# Patient Record
Sex: MALE | Race: WHITE | NOT HISPANIC OR LATINO | Employment: OTHER | URBAN - METROPOLITAN AREA
[De-identification: names, ages, dates, MRNs, and addresses within clinical notes are randomized per-mention and may not be internally consistent; named-entity substitution may affect disease eponyms.]

---

## 2023-04-11 LAB
ALANINE AMINOTRANSFERASE (SGPT) (U/L) IN SER/PLAS: 24 U/L (ref 10–52)
ALBUMIN (G/DL) IN SER/PLAS: 4.2 G/DL (ref 3.4–5)
ALKALINE PHOSPHATASE (U/L) IN SER/PLAS: 70 U/L (ref 33–136)
ANION GAP IN SER/PLAS: 10 MMOL/L (ref 10–20)
ASPARTATE AMINOTRANSFERASE (SGOT) (U/L) IN SER/PLAS: 18 U/L (ref 9–39)
BILIRUBIN TOTAL (MG/DL) IN SER/PLAS: 0.5 MG/DL (ref 0–1.2)
CALCIUM (MG/DL) IN SER/PLAS: 8.7 MG/DL (ref 8.6–10.3)
CARBON DIOXIDE, TOTAL (MMOL/L) IN SER/PLAS: 31 MMOL/L (ref 21–32)
CHLORIDE (MMOL/L) IN SER/PLAS: 102 MMOL/L (ref 98–107)
CREATININE (MG/DL) IN SER/PLAS: 1.06 MG/DL (ref 0.5–1.3)
GFR MALE: 78 ML/MIN/1.73M2
GLUCOSE (MG/DL) IN SER/PLAS: 87 MG/DL (ref 74–99)
POTASSIUM (MMOL/L) IN SER/PLAS: 3.7 MMOL/L (ref 3.5–5.3)
PROTEIN TOTAL: 6.4 G/DL (ref 6.4–8.2)
SODIUM (MMOL/L) IN SER/PLAS: 139 MMOL/L (ref 136–145)
UREA NITROGEN (MG/DL) IN SER/PLAS: 19 MG/DL (ref 6–23)

## 2023-04-12 LAB
ESTIMATED AVERAGE GLUCOSE FOR HBA1C: 131 MG/DL
HEMOGLOBIN A1C/HEMOGLOBIN TOTAL IN BLOOD: 6.2 %

## 2023-07-11 LAB
ALANINE AMINOTRANSFERASE (SGPT) (U/L) IN SER/PLAS: 28 U/L (ref 10–52)
ALBUMIN (G/DL) IN SER/PLAS: 4.2 G/DL (ref 3.4–5)
ALKALINE PHOSPHATASE (U/L) IN SER/PLAS: 70 U/L (ref 33–136)
ANION GAP IN SER/PLAS: 14 MMOL/L (ref 10–20)
ASPARTATE AMINOTRANSFERASE (SGOT) (U/L) IN SER/PLAS: 25 U/L (ref 9–39)
BILIRUBIN TOTAL (MG/DL) IN SER/PLAS: 0.5 MG/DL (ref 0–1.2)
CALCIUM (MG/DL) IN SER/PLAS: 9 MG/DL (ref 8.6–10.3)
CARBON DIOXIDE, TOTAL (MMOL/L) IN SER/PLAS: 27 MMOL/L (ref 21–32)
CHLORIDE (MMOL/L) IN SER/PLAS: 105 MMOL/L (ref 98–107)
CHOLESTEROL (MG/DL) IN SER/PLAS: 100 MG/DL (ref 0–199)
CHOLESTEROL IN HDL (MG/DL) IN SER/PLAS: 36.7 MG/DL
CHOLESTEROL/HDL RATIO: 2.7
CREATININE (MG/DL) IN SER/PLAS: 1.34 MG/DL (ref 0.5–1.3)
GFR MALE: 59 ML/MIN/1.73M2
GLUCOSE (MG/DL) IN SER/PLAS: 84 MG/DL (ref 74–99)
LDL: 43 MG/DL (ref 0–99)
POTASSIUM (MMOL/L) IN SER/PLAS: 4 MMOL/L (ref 3.5–5.3)
PROTEIN TOTAL: 6.3 G/DL (ref 6.4–8.2)
SODIUM (MMOL/L) IN SER/PLAS: 142 MMOL/L (ref 136–145)
TRIGLYCERIDE (MG/DL) IN SER/PLAS: 102 MG/DL (ref 0–149)
URATE (MG/DL) IN SER/PLAS: 4.8 MG/DL (ref 4–7.5)
UREA NITROGEN (MG/DL) IN SER/PLAS: 20 MG/DL (ref 6–23)
VLDL: 20 MG/DL (ref 0–40)

## 2023-07-12 LAB
ESTIMATED AVERAGE GLUCOSE FOR HBA1C: 131 MG/DL
HEMOGLOBIN A1C/HEMOGLOBIN TOTAL IN BLOOD: 6.2 %

## 2023-09-04 PROBLEM — E66.812 CLASS 2 OBESITY DUE TO EXCESS CALORIES WITH BODY MASS INDEX (BMI) OF 35.0 TO 35.9 IN ADULT: Status: ACTIVE | Noted: 2023-09-04

## 2023-09-04 PROBLEM — E11.9 TYPE 2 DIABETES MELLITUS (MULTI): Status: ACTIVE | Noted: 2023-09-04

## 2023-09-04 PROBLEM — R30.0 DYSURIA: Status: ACTIVE | Noted: 2023-09-04

## 2023-09-04 PROBLEM — E78.5 HYPERLIPIDEMIA: Status: ACTIVE | Noted: 2023-09-04

## 2023-09-04 PROBLEM — M54.50 CHRONIC LOW BACK PAIN: Status: ACTIVE | Noted: 2023-09-04

## 2023-09-04 PROBLEM — N40.0 PROSTATE ENLARGEMENT: Status: ACTIVE | Noted: 2023-09-04

## 2023-09-04 PROBLEM — G89.29 CHRONIC PAIN OF RIGHT KNEE: Status: ACTIVE | Noted: 2023-09-04

## 2023-09-04 PROBLEM — F32.A DEPRESSION: Status: ACTIVE | Noted: 2023-09-04

## 2023-09-04 PROBLEM — R22.1 MASS OF NECK: Status: ACTIVE | Noted: 2023-09-04

## 2023-09-04 PROBLEM — M19.079 ARTHRITIS OF FOOT: Status: ACTIVE | Noted: 2023-09-04

## 2023-09-04 PROBLEM — R51.9 HEADACHE: Status: ACTIVE | Noted: 2023-09-04

## 2023-09-04 PROBLEM — I10 HYPERTENSION: Status: ACTIVE | Noted: 2023-09-04

## 2023-09-04 PROBLEM — G56.00 CARPAL TUNNEL SYNDROME: Status: ACTIVE | Noted: 2023-09-04

## 2023-09-04 PROBLEM — M10.9 ARTHRITIS, GOUTY: Status: ACTIVE | Noted: 2023-09-04

## 2023-09-04 PROBLEM — M79.676 TOE PAIN: Status: ACTIVE | Noted: 2023-09-04

## 2023-09-04 PROBLEM — M25.561 CHRONIC PAIN OF RIGHT KNEE: Status: ACTIVE | Noted: 2023-09-04

## 2023-09-04 PROBLEM — E66.9 OBESITY (BMI 30-39.9): Status: ACTIVE | Noted: 2023-09-04

## 2023-09-04 PROBLEM — E66.09 CLASS 2 OBESITY DUE TO EXCESS CALORIES WITH BODY MASS INDEX (BMI) OF 35.0 TO 35.9 IN ADULT: Status: ACTIVE | Noted: 2023-09-04

## 2023-09-04 PROBLEM — M17.31 POST-TRAUMATIC OSTEOARTHRITIS OF RIGHT KNEE: Status: ACTIVE | Noted: 2023-09-04

## 2023-09-04 PROBLEM — R29.898: Status: ACTIVE | Noted: 2023-09-04

## 2023-09-04 PROBLEM — M62.40 MUSCLE CONTRACTURE: Status: ACTIVE | Noted: 2023-09-04

## 2023-09-04 PROBLEM — R10.13 INTERMITTENT EPIGASTRIC ABDOMINAL PAIN: Status: ACTIVE | Noted: 2023-09-04

## 2023-09-04 PROBLEM — S83.411A TEAR OF MEDIAL COLLATERAL LIGAMENT OF RIGHT KNEE: Status: ACTIVE | Noted: 2023-09-04

## 2023-09-04 PROBLEM — G47.00 INSOMNIA: Status: ACTIVE | Noted: 2023-09-04

## 2023-09-04 PROBLEM — R22.31 MASS OF FINGER OF RIGHT HAND: Status: ACTIVE | Noted: 2023-09-04

## 2023-09-04 PROBLEM — K75.81 STEATOHEPATITIS, NON-ALCOHOLIC: Status: ACTIVE | Noted: 2023-09-04

## 2023-09-04 PROBLEM — R80.9 MICROALBUMINURIA: Status: ACTIVE | Noted: 2023-09-04

## 2023-09-04 PROBLEM — K21.9 CHRONIC GERD: Status: ACTIVE | Noted: 2023-09-04

## 2023-09-04 PROBLEM — G89.29 CHRONIC LOW BACK PAIN: Status: ACTIVE | Noted: 2023-09-04

## 2023-09-04 RX ORDER — HYDROCHLOROTHIAZIDE 25 MG/1
1 TABLET ORAL DAILY
COMMUNITY
Start: 2017-08-18 | End: 2023-10-10 | Stop reason: SDUPTHER

## 2023-09-04 RX ORDER — METOPROLOL TARTRATE 50 MG/1
1 TABLET ORAL 2 TIMES DAILY
COMMUNITY
Start: 2020-12-07 | End: 2023-10-10 | Stop reason: SDUPTHER

## 2023-09-04 RX ORDER — PANTOPRAZOLE SODIUM 40 MG/1
1 TABLET, DELAYED RELEASE ORAL DAILY
COMMUNITY
Start: 2020-11-16 | End: 2023-10-10 | Stop reason: SDUPTHER

## 2023-09-04 RX ORDER — ARIPIPRAZOLE 5 MG/1
1 TABLET ORAL DAILY
COMMUNITY
Start: 2019-07-08 | End: 2023-10-10 | Stop reason: SDUPTHER

## 2023-09-04 RX ORDER — CYCLOBENZAPRINE HCL 10 MG
1 TABLET ORAL 3 TIMES DAILY PRN
COMMUNITY
Start: 2017-08-18 | End: 2023-10-10 | Stop reason: SDUPTHER

## 2023-09-04 RX ORDER — INSULIN GLARGINE 100 [IU]/ML
120 INJECTION, SOLUTION SUBCUTANEOUS NIGHTLY
COMMUNITY
Start: 2020-04-17 | End: 2023-10-10 | Stop reason: SDUPTHER

## 2023-09-04 RX ORDER — ALLOPURINOL 300 MG/1
1 TABLET ORAL DAILY
COMMUNITY
Start: 2019-02-22 | End: 2023-10-10 | Stop reason: SDUPTHER

## 2023-09-04 RX ORDER — ASPIRIN 81 MG/1
81 TABLET ORAL
COMMUNITY
Start: 2020-12-07

## 2023-09-04 RX ORDER — TAMSULOSIN HYDROCHLORIDE 0.4 MG/1
1 CAPSULE ORAL DAILY
COMMUNITY
Start: 2017-10-03 | End: 2023-10-10 | Stop reason: SDUPTHER

## 2023-09-04 RX ORDER — MIRTAZAPINE 30 MG/1
1 TABLET, FILM COATED ORAL NIGHTLY
COMMUNITY
Start: 2018-02-08 | End: 2023-10-10 | Stop reason: SDUPTHER

## 2023-09-04 RX ORDER — INSULIN PUMP SYRINGE, 3 ML
EACH MISCELLANEOUS
COMMUNITY

## 2023-09-04 RX ORDER — IBUPROFEN 200 MG
CAPSULE ORAL 4 TIMES DAILY
COMMUNITY
Start: 2020-03-24

## 2023-09-04 RX ORDER — EXENATIDE 2 MG/.85ML
INJECTION, SUSPENSION, EXTENDED RELEASE SUBCUTANEOUS
COMMUNITY
Start: 2019-05-21 | End: 2023-10-10 | Stop reason: SDUPTHER

## 2023-09-04 RX ORDER — ATORVASTATIN CALCIUM 40 MG/1
1 TABLET, FILM COATED ORAL DAILY
COMMUNITY
Start: 2020-12-07 | End: 2023-10-10 | Stop reason: SDUPTHER

## 2023-09-04 RX ORDER — ESCITALOPRAM OXALATE 20 MG/1
1 TABLET ORAL DAILY
COMMUNITY
Start: 2017-08-18 | End: 2023-10-10 | Stop reason: SDUPTHER

## 2023-09-04 RX ORDER — METFORMIN HYDROCHLORIDE 1000 MG/1
1 TABLET ORAL 2 TIMES DAILY
COMMUNITY
Start: 2017-10-03 | End: 2023-10-10 | Stop reason: SDUPTHER

## 2023-09-04 RX ORDER — LANCETS
EACH MISCELLANEOUS 2 TIMES DAILY
COMMUNITY

## 2023-10-10 ENCOUNTER — TELEPHONE (OUTPATIENT)
Dept: PRIMARY CARE | Facility: CLINIC | Age: 63
End: 2023-10-10

## 2023-10-10 ENCOUNTER — APPOINTMENT (OUTPATIENT)
Dept: PRIMARY CARE | Facility: CLINIC | Age: 63
End: 2023-10-10
Payer: COMMERCIAL

## 2023-10-10 DIAGNOSIS — G89.29 CHRONIC LOW BACK PAIN, UNSPECIFIED BACK PAIN LATERALITY, UNSPECIFIED WHETHER SCIATICA PRESENT: ICD-10-CM

## 2023-10-10 DIAGNOSIS — F32.A DEPRESSION, UNSPECIFIED DEPRESSION TYPE: ICD-10-CM

## 2023-10-10 DIAGNOSIS — E11.9 TYPE 2 DIABETES MELLITUS WITHOUT COMPLICATION, WITH LONG-TERM CURRENT USE OF INSULIN (MULTI): ICD-10-CM

## 2023-10-10 DIAGNOSIS — I10 HYPERTENSION, UNSPECIFIED TYPE: ICD-10-CM

## 2023-10-10 DIAGNOSIS — M54.50 CHRONIC LOW BACK PAIN, UNSPECIFIED BACK PAIN LATERALITY, UNSPECIFIED WHETHER SCIATICA PRESENT: ICD-10-CM

## 2023-10-10 DIAGNOSIS — M10.9 GOUT, UNSPECIFIED CAUSE, UNSPECIFIED CHRONICITY, UNSPECIFIED SITE: ICD-10-CM

## 2023-10-10 DIAGNOSIS — N40.0 PROSTATE ENLARGEMENT: ICD-10-CM

## 2023-10-10 DIAGNOSIS — E78.5 HYPERLIPIDEMIA, UNSPECIFIED HYPERLIPIDEMIA TYPE: ICD-10-CM

## 2023-10-10 DIAGNOSIS — G47.00 INSOMNIA, UNSPECIFIED TYPE: ICD-10-CM

## 2023-10-10 DIAGNOSIS — K21.00 GASTROESOPHAGEAL REFLUX DISEASE WITH ESOPHAGITIS WITHOUT HEMORRHAGE: ICD-10-CM

## 2023-10-10 DIAGNOSIS — Z79.4 TYPE 2 DIABETES MELLITUS WITHOUT COMPLICATION, WITH LONG-TERM CURRENT USE OF INSULIN (MULTI): ICD-10-CM

## 2023-10-13 RX ORDER — CYCLOBENZAPRINE HCL 10 MG
10 TABLET ORAL 3 TIMES DAILY PRN
Qty: 90 TABLET | Refills: 0 | Status: SHIPPED | OUTPATIENT
Start: 2023-10-13 | End: 2023-10-31 | Stop reason: SDUPTHER

## 2023-10-13 RX ORDER — PANTOPRAZOLE SODIUM 40 MG/1
40 TABLET, DELAYED RELEASE ORAL DAILY
Qty: 30 TABLET | Refills: 0 | Status: SHIPPED | OUTPATIENT
Start: 2023-10-13 | End: 2023-10-31 | Stop reason: SDUPTHER

## 2023-10-13 RX ORDER — INSULIN GLARGINE 100 [IU]/ML
120 INJECTION, SOLUTION SUBCUTANEOUS NIGHTLY
Qty: 36 ML | Refills: 0 | Status: SHIPPED | OUTPATIENT
Start: 2023-10-13 | End: 2023-10-31 | Stop reason: SDUPTHER

## 2023-10-13 RX ORDER — EXENATIDE 2 MG/.85ML
1 INJECTION, SUSPENSION, EXTENDED RELEASE SUBCUTANEOUS
Qty: 3.4 ML | Refills: 0 | Status: SHIPPED | OUTPATIENT
Start: 2023-10-13 | End: 2023-10-31 | Stop reason: SDUPTHER

## 2023-10-13 RX ORDER — HYDROCHLOROTHIAZIDE 25 MG/1
25 TABLET ORAL DAILY
Qty: 30 TABLET | Refills: 0 | Status: SHIPPED | OUTPATIENT
Start: 2023-10-13 | End: 2023-10-31 | Stop reason: SDUPTHER

## 2023-10-13 RX ORDER — MIRTAZAPINE 30 MG/1
30 TABLET, FILM COATED ORAL NIGHTLY
Qty: 30 TABLET | Refills: 0 | Status: SHIPPED | OUTPATIENT
Start: 2023-10-13 | End: 2023-10-31 | Stop reason: SDUPTHER

## 2023-10-13 RX ORDER — ALLOPURINOL 300 MG/1
300 TABLET ORAL DAILY
Qty: 30 TABLET | Refills: 0 | Status: SHIPPED | OUTPATIENT
Start: 2023-10-13 | End: 2023-10-31 | Stop reason: SDUPTHER

## 2023-10-13 RX ORDER — ARIPIPRAZOLE 5 MG/1
5 TABLET ORAL DAILY
Qty: 30 TABLET | Refills: 0 | Status: SHIPPED | OUTPATIENT
Start: 2023-10-13 | End: 2023-10-31 | Stop reason: SDUPTHER

## 2023-10-13 RX ORDER — TAMSULOSIN HYDROCHLORIDE 0.4 MG/1
0.4 CAPSULE ORAL DAILY
Qty: 30 CAPSULE | Refills: 0 | Status: SHIPPED | OUTPATIENT
Start: 2023-10-13 | End: 2023-10-31 | Stop reason: SDUPTHER

## 2023-10-13 RX ORDER — METOPROLOL TARTRATE 50 MG/1
50 TABLET ORAL 2 TIMES DAILY
Qty: 60 TABLET | Refills: 0 | Status: SHIPPED | OUTPATIENT
Start: 2023-10-13 | End: 2023-10-31 | Stop reason: SDUPTHER

## 2023-10-13 RX ORDER — METFORMIN HYDROCHLORIDE 1000 MG/1
1000 TABLET ORAL 2 TIMES DAILY
Qty: 60 TABLET | Refills: 0 | Status: SHIPPED | OUTPATIENT
Start: 2023-10-13 | End: 2023-10-31 | Stop reason: SDUPTHER

## 2023-10-13 RX ORDER — ATORVASTATIN CALCIUM 40 MG/1
40 TABLET, FILM COATED ORAL DAILY
Qty: 30 TABLET | Refills: 0 | Status: SHIPPED | OUTPATIENT
Start: 2023-10-13 | End: 2023-10-31 | Stop reason: SDUPTHER

## 2023-10-13 RX ORDER — ESCITALOPRAM OXALATE 20 MG/1
20 TABLET ORAL DAILY
Qty: 30 TABLET | Refills: 0 | Status: SHIPPED | OUTPATIENT
Start: 2023-10-13 | End: 2023-10-31 | Stop reason: SDUPTHER

## 2023-10-31 ENCOUNTER — LAB (OUTPATIENT)
Dept: LAB | Facility: LAB | Age: 63
End: 2023-10-31
Payer: COMMERCIAL

## 2023-10-31 ENCOUNTER — OFFICE VISIT (OUTPATIENT)
Dept: PRIMARY CARE | Facility: CLINIC | Age: 63
End: 2023-10-31
Payer: COMMERCIAL

## 2023-10-31 VITALS
BODY MASS INDEX: 36.06 KG/M2 | TEMPERATURE: 98.5 F | DIASTOLIC BLOOD PRESSURE: 60 MMHG | SYSTOLIC BLOOD PRESSURE: 128 MMHG | HEART RATE: 76 BPM | WEIGHT: 281 LBS | HEIGHT: 74 IN | OXYGEN SATURATION: 98 %

## 2023-10-31 DIAGNOSIS — M54.50 CHRONIC LOW BACK PAIN, UNSPECIFIED BACK PAIN LATERALITY, UNSPECIFIED WHETHER SCIATICA PRESENT: ICD-10-CM

## 2023-10-31 DIAGNOSIS — G89.29 CHRONIC LOW BACK PAIN, UNSPECIFIED BACK PAIN LATERALITY, UNSPECIFIED WHETHER SCIATICA PRESENT: ICD-10-CM

## 2023-10-31 DIAGNOSIS — N40.0 PROSTATE ENLARGEMENT: ICD-10-CM

## 2023-10-31 DIAGNOSIS — G47.00 INSOMNIA, UNSPECIFIED TYPE: ICD-10-CM

## 2023-10-31 DIAGNOSIS — M10.9 GOUT, UNSPECIFIED CAUSE, UNSPECIFIED CHRONICITY, UNSPECIFIED SITE: ICD-10-CM

## 2023-10-31 DIAGNOSIS — F32.A DEPRESSION, UNSPECIFIED DEPRESSION TYPE: ICD-10-CM

## 2023-10-31 DIAGNOSIS — I10 HYPERTENSION, UNSPECIFIED TYPE: ICD-10-CM

## 2023-10-31 DIAGNOSIS — Z79.4 TYPE 2 DIABETES MELLITUS WITHOUT COMPLICATION, WITH LONG-TERM CURRENT USE OF INSULIN (MULTI): ICD-10-CM

## 2023-10-31 DIAGNOSIS — E11.9 TYPE 2 DIABETES MELLITUS WITHOUT COMPLICATION, WITH LONG-TERM CURRENT USE OF INSULIN (MULTI): ICD-10-CM

## 2023-10-31 DIAGNOSIS — K21.00 GASTROESOPHAGEAL REFLUX DISEASE WITH ESOPHAGITIS WITHOUT HEMORRHAGE: ICD-10-CM

## 2023-10-31 DIAGNOSIS — E78.5 HYPERLIPIDEMIA, UNSPECIFIED HYPERLIPIDEMIA TYPE: ICD-10-CM

## 2023-10-31 LAB
ALBUMIN SERPL BCP-MCNC: 4.4 G/DL (ref 3.4–5)
ALP SERPL-CCNC: 81 U/L (ref 33–136)
ALT SERPL W P-5'-P-CCNC: 32 U/L (ref 10–52)
ANION GAP SERPL CALC-SCNC: 12 MMOL/L (ref 10–20)
AST SERPL W P-5'-P-CCNC: 25 U/L (ref 9–39)
BILIRUB SERPL-MCNC: 0.7 MG/DL (ref 0–1.2)
BUN SERPL-MCNC: 18 MG/DL (ref 6–23)
CALCIUM SERPL-MCNC: 9.6 MG/DL (ref 8.6–10.3)
CHLORIDE SERPL-SCNC: 103 MMOL/L (ref 98–107)
CO2 SERPL-SCNC: 29 MMOL/L (ref 21–32)
CREAT SERPL-MCNC: 1.12 MG/DL (ref 0.5–1.3)
GFR SERPL CREATININE-BSD FRML MDRD: 73 ML/MIN/1.73M*2
GLUCOSE SERPL-MCNC: 137 MG/DL (ref 74–99)
POTASSIUM SERPL-SCNC: 4.1 MMOL/L (ref 3.5–5.3)
PROT SERPL-MCNC: 6.3 G/DL (ref 6.4–8.2)
SODIUM SERPL-SCNC: 140 MMOL/L (ref 136–145)
URATE SERPL-MCNC: 5 MG/DL (ref 4–7.5)

## 2023-10-31 PROCEDURE — 99215 OFFICE O/P EST HI 40 MIN: CPT | Performed by: FAMILY MEDICINE

## 2023-10-31 PROCEDURE — 3044F HG A1C LEVEL LT 7.0%: CPT | Performed by: FAMILY MEDICINE

## 2023-10-31 PROCEDURE — 1036F TOBACCO NON-USER: CPT | Performed by: FAMILY MEDICINE

## 2023-10-31 PROCEDURE — 3074F SYST BP LT 130 MM HG: CPT | Performed by: FAMILY MEDICINE

## 2023-10-31 PROCEDURE — 83036 HEMOGLOBIN GLYCOSYLATED A1C: CPT

## 2023-10-31 PROCEDURE — 84550 ASSAY OF BLOOD/URIC ACID: CPT

## 2023-10-31 PROCEDURE — 80053 COMPREHEN METABOLIC PANEL: CPT

## 2023-10-31 PROCEDURE — 3078F DIAST BP <80 MM HG: CPT | Performed by: FAMILY MEDICINE

## 2023-10-31 PROCEDURE — 36415 COLL VENOUS BLD VENIPUNCTURE: CPT

## 2023-10-31 RX ORDER — TAMSULOSIN HYDROCHLORIDE 0.4 MG/1
0.4 CAPSULE ORAL DAILY
Qty: 90 CAPSULE | Refills: 0 | Status: SHIPPED | OUTPATIENT
Start: 2023-10-31 | End: 2024-01-30 | Stop reason: SDUPTHER

## 2023-10-31 RX ORDER — ESCITALOPRAM OXALATE 20 MG/1
20 TABLET ORAL DAILY
Qty: 90 TABLET | Refills: 0 | Status: SHIPPED | OUTPATIENT
Start: 2023-10-31 | End: 2024-01-30 | Stop reason: SDUPTHER

## 2023-10-31 RX ORDER — INSULIN GLARGINE 100 [IU]/ML
120 INJECTION, SOLUTION SUBCUTANEOUS NIGHTLY
Qty: 36 ML | Refills: 2 | Status: SHIPPED | OUTPATIENT
Start: 2023-10-31 | End: 2024-01-30 | Stop reason: SDUPTHER

## 2023-10-31 RX ORDER — ARIPIPRAZOLE 5 MG/1
5 TABLET ORAL DAILY
Qty: 90 TABLET | Refills: 0 | Status: SHIPPED | OUTPATIENT
Start: 2023-10-31 | End: 2024-01-30 | Stop reason: SDUPTHER

## 2023-10-31 RX ORDER — ALLOPURINOL 300 MG/1
300 TABLET ORAL DAILY
Qty: 90 TABLET | Refills: 0 | Status: SHIPPED | OUTPATIENT
Start: 2023-10-31 | End: 2024-01-30 | Stop reason: SDUPTHER

## 2023-10-31 RX ORDER — MIRTAZAPINE 30 MG/1
30 TABLET, FILM COATED ORAL NIGHTLY
Qty: 90 TABLET | Refills: 0 | Status: SHIPPED | OUTPATIENT
Start: 2023-10-31 | End: 2024-01-30 | Stop reason: SDUPTHER

## 2023-10-31 RX ORDER — METFORMIN HYDROCHLORIDE 1000 MG/1
1000 TABLET ORAL 2 TIMES DAILY
Qty: 180 TABLET | Refills: 0 | Status: SHIPPED | OUTPATIENT
Start: 2023-10-31 | End: 2024-01-18 | Stop reason: SDUPTHER

## 2023-10-31 RX ORDER — CYCLOBENZAPRINE HCL 10 MG
10 TABLET ORAL 3 TIMES DAILY PRN
Qty: 90 TABLET | Refills: 2 | Status: SHIPPED | OUTPATIENT
Start: 2023-10-31 | End: 2024-01-30 | Stop reason: SDUPTHER

## 2023-10-31 RX ORDER — EXENATIDE 2 MG/.85ML
1 INJECTION, SUSPENSION, EXTENDED RELEASE SUBCUTANEOUS
Qty: 12 ML | Refills: 0 | Status: SHIPPED | OUTPATIENT
Start: 2023-10-31 | End: 2024-01-30 | Stop reason: SDUPTHER

## 2023-10-31 RX ORDER — ATORVASTATIN CALCIUM 40 MG/1
40 TABLET, FILM COATED ORAL DAILY
Qty: 90 TABLET | Refills: 0 | Status: SHIPPED | OUTPATIENT
Start: 2023-10-31 | End: 2024-01-30 | Stop reason: SDUPTHER

## 2023-10-31 RX ORDER — HYDROCHLOROTHIAZIDE 25 MG/1
25 TABLET ORAL DAILY
Qty: 90 TABLET | Refills: 0 | Status: SHIPPED | OUTPATIENT
Start: 2023-10-31 | End: 2024-01-30 | Stop reason: SDUPTHER

## 2023-10-31 RX ORDER — METOPROLOL TARTRATE 50 MG/1
50 TABLET ORAL 2 TIMES DAILY
Qty: 180 TABLET | Refills: 0 | Status: SHIPPED | OUTPATIENT
Start: 2023-10-31 | End: 2024-01-30 | Stop reason: SDUPTHER

## 2023-10-31 RX ORDER — PANTOPRAZOLE SODIUM 40 MG/1
40 TABLET, DELAYED RELEASE ORAL DAILY
Qty: 90 TABLET | Refills: 0 | Status: SHIPPED | OUTPATIENT
Start: 2023-10-31 | End: 2024-01-30 | Stop reason: SDUPTHER

## 2023-10-31 NOTE — PROGRESS NOTES
"Subjective   Patient ID: Domingo Conrad is a 63 y.o. male who presents for Med Refill.    HPI Pt is here for refills of medications to treat the following medical conditions. Unless otherwise stated, these conditions are well-controlled, pt is taking medications s Rx, and there are no reported side effects to medications or other treatment: Arthritis, GOUTY, chronic GERD, Depression, DM2, Hypertension, Hyperlipidemia, Insomnia, Prostate enlargement, chronic low back pain.     Review of Systems   All other systems reviewed and are negative.      Objective   /60   Pulse 76   Temp 36.9 °C (98.5 °F)   Ht 1.88 m (6' 2\")   Wt 127 kg (281 lb)   SpO2 98%   BMI 36.08 kg/m²     Physical Exam  Constitutional:       Appearance: Normal appearance.   Eyes:      Conjunctiva/sclera: Conjunctivae normal.   Cardiovascular:      Rate and Rhythm: Normal rate and regular rhythm.   Pulmonary:      Effort: Pulmonary effort is normal.      Breath sounds: Normal breath sounds.   Abdominal:      Palpations: Abdomen is soft.   Musculoskeletal:         General: Normal range of motion.   Skin:     General: Skin is warm and dry.   Neurological:      Mental Status: He is alert.   Psychiatric:         Mood and Affect: Mood normal.         Assessment/Plan Chronic medications refilled per protocol.         "

## 2023-11-01 LAB
EST. AVERAGE GLUCOSE BLD GHB EST-MCNC: 126 MG/DL
HBA1C MFR BLD: 6 %

## 2024-01-15 DIAGNOSIS — Z79.4 TYPE 2 DIABETES MELLITUS WITHOUT COMPLICATION, WITH LONG-TERM CURRENT USE OF INSULIN (MULTI): ICD-10-CM

## 2024-01-15 DIAGNOSIS — E11.9 TYPE 2 DIABETES MELLITUS WITHOUT COMPLICATION, WITH LONG-TERM CURRENT USE OF INSULIN (MULTI): ICD-10-CM

## 2024-01-16 RX ORDER — METFORMIN HYDROCHLORIDE 1000 MG/1
1000 TABLET ORAL 2 TIMES DAILY
Qty: 180 TABLET | Refills: 0 | OUTPATIENT
Start: 2024-01-16

## 2024-01-18 DIAGNOSIS — E11.9 TYPE 2 DIABETES MELLITUS WITHOUT COMPLICATION, WITH LONG-TERM CURRENT USE OF INSULIN (MULTI): ICD-10-CM

## 2024-01-18 DIAGNOSIS — Z79.4 TYPE 2 DIABETES MELLITUS WITHOUT COMPLICATION, WITH LONG-TERM CURRENT USE OF INSULIN (MULTI): ICD-10-CM

## 2024-01-18 RX ORDER — LANCETS 33 GAUGE
1 EACH MISCELLANEOUS 2 TIMES DAILY
COMMUNITY
Start: 2023-10-30

## 2024-01-18 RX ORDER — PEN NEEDLE, DIABETIC 31 GX3/16"
1 NEEDLE, DISPOSABLE MISCELLANEOUS DAILY
COMMUNITY
Start: 2023-11-10 | End: 2024-01-30 | Stop reason: SDUPTHER

## 2024-01-18 NOTE — TELEPHONE ENCOUNTER
Patient has appointment  scheduled 1/31/24 but will be out of metformin before visit.  30 day rx pended per office protocol for approval.

## 2024-01-19 RX ORDER — METFORMIN HYDROCHLORIDE 1000 MG/1
1000 TABLET ORAL 2 TIMES DAILY
Qty: 180 TABLET | Refills: 0 | OUTPATIENT
Start: 2024-01-19

## 2024-01-19 RX ORDER — METFORMIN HYDROCHLORIDE 1000 MG/1
1000 TABLET ORAL 2 TIMES DAILY
Qty: 60 TABLET | Refills: 0 | Status: SHIPPED | OUTPATIENT
Start: 2024-01-19 | End: 2024-01-30 | Stop reason: SDUPTHER

## 2024-01-30 ENCOUNTER — LAB (OUTPATIENT)
Dept: LAB | Facility: LAB | Age: 64
End: 2024-01-30
Payer: COMMERCIAL

## 2024-01-30 ENCOUNTER — OFFICE VISIT (OUTPATIENT)
Dept: PRIMARY CARE | Facility: CLINIC | Age: 64
End: 2024-01-30
Payer: COMMERCIAL

## 2024-01-30 VITALS
BODY MASS INDEX: 35.68 KG/M2 | WEIGHT: 278 LBS | TEMPERATURE: 98.1 F | OXYGEN SATURATION: 98 % | DIASTOLIC BLOOD PRESSURE: 64 MMHG | HEIGHT: 74 IN | HEART RATE: 90 BPM | SYSTOLIC BLOOD PRESSURE: 122 MMHG

## 2024-01-30 DIAGNOSIS — M54.50 CHRONIC LOW BACK PAIN, UNSPECIFIED BACK PAIN LATERALITY, UNSPECIFIED WHETHER SCIATICA PRESENT: ICD-10-CM

## 2024-01-30 DIAGNOSIS — N40.0 PROSTATE ENLARGEMENT: ICD-10-CM

## 2024-01-30 DIAGNOSIS — G89.29 CHRONIC LOW BACK PAIN, UNSPECIFIED BACK PAIN LATERALITY, UNSPECIFIED WHETHER SCIATICA PRESENT: ICD-10-CM

## 2024-01-30 DIAGNOSIS — E11.9 TYPE 2 DIABETES MELLITUS WITHOUT COMPLICATION, WITH LONG-TERM CURRENT USE OF INSULIN (MULTI): ICD-10-CM

## 2024-01-30 DIAGNOSIS — M10.9 GOUT, UNSPECIFIED CAUSE, UNSPECIFIED CHRONICITY, UNSPECIFIED SITE: ICD-10-CM

## 2024-01-30 DIAGNOSIS — E78.5 HYPERLIPIDEMIA, UNSPECIFIED HYPERLIPIDEMIA TYPE: ICD-10-CM

## 2024-01-30 DIAGNOSIS — G47.00 INSOMNIA, UNSPECIFIED TYPE: ICD-10-CM

## 2024-01-30 DIAGNOSIS — Z79.4 TYPE 2 DIABETES MELLITUS WITHOUT COMPLICATION, WITH LONG-TERM CURRENT USE OF INSULIN (MULTI): ICD-10-CM

## 2024-01-30 DIAGNOSIS — K21.00 GASTROESOPHAGEAL REFLUX DISEASE WITH ESOPHAGITIS WITHOUT HEMORRHAGE: ICD-10-CM

## 2024-01-30 DIAGNOSIS — I10 HYPERTENSION, UNSPECIFIED TYPE: ICD-10-CM

## 2024-01-30 DIAGNOSIS — F32.A DEPRESSION, UNSPECIFIED DEPRESSION TYPE: ICD-10-CM

## 2024-01-30 LAB
ALBUMIN SERPL BCP-MCNC: 4.2 G/DL (ref 3.4–5)
ALP SERPL-CCNC: 71 U/L (ref 33–136)
ALT SERPL W P-5'-P-CCNC: 31 U/L (ref 10–52)
ANION GAP SERPL CALC-SCNC: 10 MMOL/L (ref 10–20)
AST SERPL W P-5'-P-CCNC: 24 U/L (ref 9–39)
BILIRUB SERPL-MCNC: 0.6 MG/DL (ref 0–1.2)
BUN SERPL-MCNC: 19 MG/DL (ref 6–23)
CALCIUM SERPL-MCNC: 8.9 MG/DL (ref 8.6–10.3)
CHLORIDE SERPL-SCNC: 103 MMOL/L (ref 98–107)
CHOLEST SERPL-MCNC: 101 MG/DL (ref 0–199)
CHOLESTEROL/HDL RATIO: 2.7
CO2 SERPL-SCNC: 32 MMOL/L (ref 21–32)
CREAT SERPL-MCNC: 1.23 MG/DL (ref 0.5–1.3)
EGFRCR SERPLBLD CKD-EPI 2021: 66 ML/MIN/1.73M*2
GLUCOSE SERPL-MCNC: 76 MG/DL (ref 74–99)
HDLC SERPL-MCNC: 37 MG/DL
LDLC SERPL CALC-MCNC: 46 MG/DL
NON HDL CHOLESTEROL: 64 MG/DL (ref 0–149)
POTASSIUM SERPL-SCNC: 4.2 MMOL/L (ref 3.5–5.3)
PROT SERPL-MCNC: 6.1 G/DL (ref 6.4–8.2)
PSA SERPL-MCNC: 1.3 NG/ML
SODIUM SERPL-SCNC: 141 MMOL/L (ref 136–145)
TRIGL SERPL-MCNC: 89 MG/DL (ref 0–149)
URATE SERPL-MCNC: 4.8 MG/DL (ref 4–7.5)
VLDL: 18 MG/DL (ref 0–40)

## 2024-01-30 PROCEDURE — 80053 COMPREHEN METABOLIC PANEL: CPT

## 2024-01-30 PROCEDURE — 84550 ASSAY OF BLOOD/URIC ACID: CPT

## 2024-01-30 PROCEDURE — 3074F SYST BP LT 130 MM HG: CPT | Performed by: FAMILY MEDICINE

## 2024-01-30 PROCEDURE — 99215 OFFICE O/P EST HI 40 MIN: CPT | Performed by: FAMILY MEDICINE

## 2024-01-30 PROCEDURE — 84153 ASSAY OF PSA TOTAL: CPT

## 2024-01-30 PROCEDURE — 80061 LIPID PANEL: CPT

## 2024-01-30 PROCEDURE — 3078F DIAST BP <80 MM HG: CPT | Performed by: FAMILY MEDICINE

## 2024-01-30 PROCEDURE — 36415 COLL VENOUS BLD VENIPUNCTURE: CPT

## 2024-01-30 PROCEDURE — 83036 HEMOGLOBIN GLYCOSYLATED A1C: CPT

## 2024-01-30 PROCEDURE — 1036F TOBACCO NON-USER: CPT | Performed by: FAMILY MEDICINE

## 2024-01-30 RX ORDER — LANCETS
EACH MISCELLANEOUS
Qty: 125 EACH | Refills: 11 | Status: SHIPPED | OUTPATIENT
Start: 2024-01-30

## 2024-01-30 RX ORDER — ARIPIPRAZOLE 5 MG/1
5 TABLET ORAL DAILY
Qty: 90 TABLET | Refills: 0 | Status: SHIPPED | OUTPATIENT
Start: 2024-01-30 | End: 2024-04-30 | Stop reason: SDUPTHER

## 2024-01-30 RX ORDER — ALLOPURINOL 300 MG/1
300 TABLET ORAL DAILY
Qty: 90 TABLET | Refills: 0 | Status: SHIPPED | OUTPATIENT
Start: 2024-01-30 | End: 2024-04-30 | Stop reason: SDUPTHER

## 2024-01-30 RX ORDER — MIRTAZAPINE 30 MG/1
30 TABLET, FILM COATED ORAL NIGHTLY
Qty: 90 TABLET | Refills: 0 | Status: SHIPPED | OUTPATIENT
Start: 2024-01-30 | End: 2024-04-30 | Stop reason: SDUPTHER

## 2024-01-30 RX ORDER — ESCITALOPRAM OXALATE 20 MG/1
20 TABLET ORAL DAILY
Qty: 90 TABLET | Refills: 0 | Status: SHIPPED | OUTPATIENT
Start: 2024-01-30 | End: 2024-04-30 | Stop reason: SDUPTHER

## 2024-01-30 RX ORDER — TAMSULOSIN HYDROCHLORIDE 0.4 MG/1
0.4 CAPSULE ORAL DAILY
Qty: 90 CAPSULE | Refills: 0 | Status: SHIPPED | OUTPATIENT
Start: 2024-01-30 | End: 2024-04-30 | Stop reason: SDUPTHER

## 2024-01-30 RX ORDER — HYDROCHLOROTHIAZIDE 25 MG/1
25 TABLET ORAL DAILY
Qty: 90 TABLET | Refills: 0 | Status: SHIPPED | OUTPATIENT
Start: 2024-01-30 | End: 2024-05-20

## 2024-01-30 RX ORDER — CYCLOBENZAPRINE HCL 10 MG
10 TABLET ORAL 3 TIMES DAILY PRN
Qty: 90 TABLET | Refills: 2 | Status: SHIPPED | OUTPATIENT
Start: 2024-01-30 | End: 2024-04-30 | Stop reason: SDUPTHER

## 2024-01-30 RX ORDER — IBUPROFEN 200 MG
1 CAPSULE ORAL 4 TIMES DAILY
Qty: 125 STRIP | Refills: 11 | Status: SHIPPED | OUTPATIENT
Start: 2024-01-30 | End: 2025-01-29

## 2024-01-30 RX ORDER — ATORVASTATIN CALCIUM 40 MG/1
40 TABLET, FILM COATED ORAL DAILY
Qty: 90 TABLET | Refills: 0 | Status: SHIPPED | OUTPATIENT
Start: 2024-01-30 | End: 2024-04-30 | Stop reason: SDUPTHER

## 2024-01-30 RX ORDER — EXENATIDE 2 MG/.85ML
1 INJECTION, SUSPENSION, EXTENDED RELEASE SUBCUTANEOUS
Qty: 12 ML | Refills: 0 | Status: SHIPPED | OUTPATIENT
Start: 2024-01-30 | End: 2024-04-30 | Stop reason: SDUPTHER

## 2024-01-30 RX ORDER — METFORMIN HYDROCHLORIDE 1000 MG/1
1000 TABLET ORAL 2 TIMES DAILY
Qty: 60 TABLET | Refills: 0 | Status: SHIPPED | OUTPATIENT
Start: 2024-01-30 | End: 2024-03-12 | Stop reason: SDUPTHER

## 2024-01-30 RX ORDER — PANTOPRAZOLE SODIUM 40 MG/1
40 TABLET, DELAYED RELEASE ORAL DAILY
Qty: 90 TABLET | Refills: 0 | Status: SHIPPED | OUTPATIENT
Start: 2024-01-30 | End: 2024-04-30 | Stop reason: SDUPTHER

## 2024-01-30 RX ORDER — INSULIN GLARGINE 100 [IU]/ML
120 INJECTION, SOLUTION SUBCUTANEOUS NIGHTLY
Qty: 36 ML | Refills: 2 | Status: SHIPPED | OUTPATIENT
Start: 2024-01-30 | End: 2024-04-30 | Stop reason: SDUPTHER

## 2024-01-30 RX ORDER — METOPROLOL TARTRATE 50 MG/1
50 TABLET ORAL 2 TIMES DAILY
Qty: 180 TABLET | Refills: 0 | Status: SHIPPED | OUTPATIENT
Start: 2024-01-30 | End: 2024-04-30 | Stop reason: SDUPTHER

## 2024-01-30 RX ORDER — DEXTROSE 4 G
TABLET,CHEWABLE ORAL
Qty: 1 EACH | Refills: 0 | Status: SHIPPED | OUTPATIENT
Start: 2024-01-30

## 2024-01-30 RX ORDER — PEN NEEDLE, DIABETIC 30 GX3/16"
1 NEEDLE, DISPOSABLE MISCELLANEOUS DAILY
Qty: 100 EACH | Refills: 3 | Status: SHIPPED | OUTPATIENT
Start: 2024-01-30 | End: 2024-06-04

## 2024-01-30 NOTE — PROGRESS NOTES
"Subjective   Patient ID: Domingo Conrad is a 63 y.o. male who presents for Med Refill (If fasting for labs. ).    HPI Pt is here for refills of medications to treat the following medical conditions. Unless otherwise stated, these conditions are well-controlled, pt is taking medications s Rx, and there are no reported side effects to medications or other treatment: DM2, GOUT, Chronic low back pain, Depression, Hyperlipidemia, Prostate enlargement, Insomnia.     Also states his blood sugar numbers are all over the place. Has brought in list of readings. Averaging 105 fasting in the mornings and around 200 at times at night. Has increased dose of Lantus to 143 at night. Is requesting new glucometer with supplies because maybe his machine is broken. Also would like to discuss possibly increasing dose of Metformin because of his numbers.     Review of Systems   All other systems reviewed and are negative.      Objective   /64   Pulse 90   Temp 36.7 °C (98.1 °F)   Ht 1.88 m (6' 2\")   Wt 126 kg (278 lb)   SpO2 98%   BMI 35.69 kg/m²     Physical Exam  Constitutional:       Appearance: Normal appearance.   Eyes:      Conjunctiva/sclera: Conjunctivae normal.   Cardiovascular:      Rate and Rhythm: Normal rate and regular rhythm.   Pulmonary:      Effort: Pulmonary effort is normal.      Breath sounds: Normal breath sounds.   Abdominal:      Palpations: Abdomen is soft.   Musculoskeletal:         General: Normal range of motion.   Skin:     General: Skin is warm and dry.   Neurological:      Mental Status: He is alert.   Psychiatric:         Mood and Affect: Mood normal.         Assessment/Plan Chronic medications refilled per protocol. Reviewed his home numbers and all are in range for the most part. Post prandials at or less than 200. Fasting numbers . Will have him continue current dose. Reminded him to adjust his Lantus on the AM FBS number NOT the bedtime number. He has been increasing Lantus by 2u each " time he has bedtime number above 140. He states he understands this is not correct and could repeat to me the proper procedure for adjusting his Lantus. Since his AM numbers are all in range, however, we will not make any adjustments.   RTC 3 months/PRN

## 2024-01-31 LAB
EST. AVERAGE GLUCOSE BLD GHB EST-MCNC: 120 MG/DL
HBA1C MFR BLD: 5.8 %

## 2024-02-02 DIAGNOSIS — G47.00 INSOMNIA, UNSPECIFIED TYPE: ICD-10-CM

## 2024-02-02 RX ORDER — MIRTAZAPINE 30 MG/1
30 TABLET, FILM COATED ORAL NIGHTLY
Qty: 90 TABLET | Refills: 0 | OUTPATIENT
Start: 2024-02-02

## 2024-02-05 ENCOUNTER — TELEPHONE (OUTPATIENT)
Dept: PRIMARY CARE | Facility: CLINIC | Age: 64
End: 2024-02-05
Payer: COMMERCIAL

## 2024-02-12 ENCOUNTER — TELEPHONE (OUTPATIENT)
Dept: PRIMARY CARE | Facility: CLINIC | Age: 64
End: 2024-02-12
Payer: COMMERCIAL

## 2024-02-15 DIAGNOSIS — E11.9 TYPE 2 DIABETES MELLITUS WITHOUT COMPLICATION, WITH LONG-TERM CURRENT USE OF INSULIN (MULTI): ICD-10-CM

## 2024-02-15 DIAGNOSIS — I10 HYPERTENSION, UNSPECIFIED TYPE: ICD-10-CM

## 2024-02-15 DIAGNOSIS — Z79.4 TYPE 2 DIABETES MELLITUS WITHOUT COMPLICATION, WITH LONG-TERM CURRENT USE OF INSULIN (MULTI): ICD-10-CM

## 2024-02-16 RX ORDER — METFORMIN HYDROCHLORIDE 1000 MG/1
1000 TABLET ORAL 2 TIMES DAILY
Qty: 120 TABLET | Refills: 0 | OUTPATIENT
Start: 2024-02-16 | End: 2024-04-16

## 2024-03-12 DIAGNOSIS — Z79.4 TYPE 2 DIABETES MELLITUS WITHOUT COMPLICATION, WITH LONG-TERM CURRENT USE OF INSULIN (MULTI): ICD-10-CM

## 2024-03-12 DIAGNOSIS — E11.9 TYPE 2 DIABETES MELLITUS WITHOUT COMPLICATION, WITH LONG-TERM CURRENT USE OF INSULIN (MULTI): ICD-10-CM

## 2024-03-12 NOTE — TELEPHONE ENCOUNTER
Patient called stating he was only given one month supply of Metformin and is needing additional two month supply. Order placed. Please approve.

## 2024-03-14 RX ORDER — METFORMIN HYDROCHLORIDE 1000 MG/1
1000 TABLET ORAL 2 TIMES DAILY
Qty: 60 TABLET | Refills: 1 | Status: SHIPPED | OUTPATIENT
Start: 2024-03-14 | End: 2024-04-30 | Stop reason: SDUPTHER

## 2024-04-01 ENCOUNTER — TELEPHONE (OUTPATIENT)
Dept: PRIMARY CARE | Facility: CLINIC | Age: 64
End: 2024-04-01
Payer: COMMERCIAL

## 2024-04-25 DIAGNOSIS — Z79.4 TYPE 2 DIABETES MELLITUS WITHOUT COMPLICATION, WITH LONG-TERM CURRENT USE OF INSULIN (MULTI): ICD-10-CM

## 2024-04-25 DIAGNOSIS — E11.9 TYPE 2 DIABETES MELLITUS WITHOUT COMPLICATION, WITH LONG-TERM CURRENT USE OF INSULIN (MULTI): ICD-10-CM

## 2024-04-26 RX ORDER — INSULIN GLARGINE 100 [IU]/ML
120 INJECTION, SOLUTION SUBCUTANEOUS NIGHTLY
Qty: 36 ML | Refills: 0 | OUTPATIENT
Start: 2024-04-26 | End: 2024-07-25

## 2024-04-30 ENCOUNTER — OFFICE VISIT (OUTPATIENT)
Dept: PRIMARY CARE | Facility: CLINIC | Age: 64
End: 2024-04-30
Payer: COMMERCIAL

## 2024-04-30 ENCOUNTER — LAB (OUTPATIENT)
Dept: LAB | Facility: LAB | Age: 64
End: 2024-04-30
Payer: COMMERCIAL

## 2024-04-30 VITALS
HEART RATE: 81 BPM | BODY MASS INDEX: 35.42 KG/M2 | DIASTOLIC BLOOD PRESSURE: 70 MMHG | WEIGHT: 276 LBS | HEIGHT: 74 IN | OXYGEN SATURATION: 98 % | TEMPERATURE: 98.1 F | SYSTOLIC BLOOD PRESSURE: 130 MMHG

## 2024-04-30 DIAGNOSIS — I10 HYPERTENSION, UNSPECIFIED TYPE: ICD-10-CM

## 2024-04-30 DIAGNOSIS — F32.A DEPRESSION, UNSPECIFIED DEPRESSION TYPE: ICD-10-CM

## 2024-04-30 DIAGNOSIS — Z79.4 TYPE 2 DIABETES MELLITUS WITHOUT COMPLICATION, WITH LONG-TERM CURRENT USE OF INSULIN (MULTI): ICD-10-CM

## 2024-04-30 DIAGNOSIS — G89.29 CHRONIC LOW BACK PAIN, UNSPECIFIED BACK PAIN LATERALITY, UNSPECIFIED WHETHER SCIATICA PRESENT: ICD-10-CM

## 2024-04-30 DIAGNOSIS — N40.0 PROSTATE ENLARGEMENT: ICD-10-CM

## 2024-04-30 DIAGNOSIS — M54.50 CHRONIC LOW BACK PAIN, UNSPECIFIED BACK PAIN LATERALITY, UNSPECIFIED WHETHER SCIATICA PRESENT: ICD-10-CM

## 2024-04-30 DIAGNOSIS — E78.5 HYPERLIPIDEMIA, UNSPECIFIED HYPERLIPIDEMIA TYPE: ICD-10-CM

## 2024-04-30 DIAGNOSIS — E11.9 TYPE 2 DIABETES MELLITUS WITHOUT COMPLICATION, WITH LONG-TERM CURRENT USE OF INSULIN (MULTI): ICD-10-CM

## 2024-04-30 DIAGNOSIS — M10.9 GOUT, UNSPECIFIED CAUSE, UNSPECIFIED CHRONICITY, UNSPECIFIED SITE: ICD-10-CM

## 2024-04-30 DIAGNOSIS — G47.00 INSOMNIA, UNSPECIFIED TYPE: ICD-10-CM

## 2024-04-30 DIAGNOSIS — K21.00 GASTROESOPHAGEAL REFLUX DISEASE WITH ESOPHAGITIS WITHOUT HEMORRHAGE: ICD-10-CM

## 2024-04-30 LAB
ALBUMIN SERPL BCP-MCNC: 4.4 G/DL (ref 3.4–5)
ALP SERPL-CCNC: 85 U/L (ref 33–136)
ALT SERPL W P-5'-P-CCNC: 27 U/L (ref 10–52)
ANION GAP SERPL CALC-SCNC: 12 MMOL/L (ref 10–20)
AST SERPL W P-5'-P-CCNC: 21 U/L (ref 9–39)
BILIRUB SERPL-MCNC: 0.8 MG/DL (ref 0–1.2)
BUN SERPL-MCNC: 18 MG/DL (ref 6–23)
CALCIUM SERPL-MCNC: 9.2 MG/DL (ref 8.6–10.3)
CHLORIDE SERPL-SCNC: 103 MMOL/L (ref 98–107)
CO2 SERPL-SCNC: 29 MMOL/L (ref 21–32)
CREAT SERPL-MCNC: 1.16 MG/DL (ref 0.5–1.3)
EGFRCR SERPLBLD CKD-EPI 2021: 70 ML/MIN/1.73M*2
GLUCOSE SERPL-MCNC: 76 MG/DL (ref 74–99)
POTASSIUM SERPL-SCNC: 3.7 MMOL/L (ref 3.5–5.3)
PROT SERPL-MCNC: 6.6 G/DL (ref 6.4–8.2)
SODIUM SERPL-SCNC: 140 MMOL/L (ref 136–145)

## 2024-04-30 PROCEDURE — 3075F SYST BP GE 130 - 139MM HG: CPT | Performed by: FAMILY MEDICINE

## 2024-04-30 PROCEDURE — 3044F HG A1C LEVEL LT 7.0%: CPT | Performed by: FAMILY MEDICINE

## 2024-04-30 PROCEDURE — 83036 HEMOGLOBIN GLYCOSYLATED A1C: CPT

## 2024-04-30 PROCEDURE — 3048F LDL-C <100 MG/DL: CPT | Performed by: FAMILY MEDICINE

## 2024-04-30 PROCEDURE — 99214 OFFICE O/P EST MOD 30 MIN: CPT | Performed by: FAMILY MEDICINE

## 2024-04-30 PROCEDURE — 36415 COLL VENOUS BLD VENIPUNCTURE: CPT

## 2024-04-30 PROCEDURE — 80053 COMPREHEN METABOLIC PANEL: CPT

## 2024-04-30 PROCEDURE — 3078F DIAST BP <80 MM HG: CPT | Performed by: FAMILY MEDICINE

## 2024-04-30 RX ORDER — INSULIN GLARGINE 100 [IU]/ML
120 INJECTION, SOLUTION SUBCUTANEOUS NIGHTLY
Qty: 36 ML | Refills: 2 | Status: SHIPPED | OUTPATIENT
Start: 2024-04-30 | End: 2024-07-29

## 2024-04-30 RX ORDER — MIRTAZAPINE 30 MG/1
30 TABLET, FILM COATED ORAL NIGHTLY
Qty: 90 TABLET | Refills: 0 | Status: SHIPPED | OUTPATIENT
Start: 2024-04-30 | End: 2024-07-29

## 2024-04-30 RX ORDER — ALLOPURINOL 300 MG/1
300 TABLET ORAL DAILY
Qty: 90 TABLET | Refills: 0 | Status: SHIPPED | OUTPATIENT
Start: 2024-04-30 | End: 2024-07-29

## 2024-04-30 RX ORDER — METOPROLOL TARTRATE 50 MG/1
50 TABLET ORAL 2 TIMES DAILY
Qty: 180 TABLET | Refills: 0 | Status: SHIPPED | OUTPATIENT
Start: 2024-04-30 | End: 2024-05-20

## 2024-04-30 RX ORDER — METFORMIN HYDROCHLORIDE 1000 MG/1
1000 TABLET ORAL 2 TIMES DAILY
Qty: 180 TABLET | Refills: 0 | Status: SHIPPED | OUTPATIENT
Start: 2024-04-30 | End: 2024-07-29

## 2024-04-30 RX ORDER — EXENATIDE 2 MG/.85ML
1 INJECTION, SUSPENSION, EXTENDED RELEASE SUBCUTANEOUS
Qty: 12 ML | Refills: 0 | Status: SHIPPED | OUTPATIENT
Start: 2024-05-05 | End: 2024-05-07 | Stop reason: SDUPTHER

## 2024-04-30 RX ORDER — ESCITALOPRAM OXALATE 20 MG/1
20 TABLET ORAL DAILY
Qty: 90 TABLET | Refills: 0 | Status: SHIPPED | OUTPATIENT
Start: 2024-04-30 | End: 2024-07-29

## 2024-04-30 RX ORDER — PANTOPRAZOLE SODIUM 40 MG/1
40 TABLET, DELAYED RELEASE ORAL DAILY
Qty: 90 TABLET | Refills: 0 | Status: SHIPPED | OUTPATIENT
Start: 2024-04-30 | End: 2024-07-29

## 2024-04-30 RX ORDER — ARIPIPRAZOLE 5 MG/1
5 TABLET ORAL DAILY
Qty: 90 TABLET | Refills: 0 | Status: SHIPPED | OUTPATIENT
Start: 2024-04-30 | End: 2024-07-29

## 2024-04-30 RX ORDER — ATORVASTATIN CALCIUM 40 MG/1
40 TABLET, FILM COATED ORAL DAILY
Qty: 90 TABLET | Refills: 0 | Status: SHIPPED | OUTPATIENT
Start: 2024-04-30 | End: 2024-07-29

## 2024-04-30 RX ORDER — TAMSULOSIN HYDROCHLORIDE 0.4 MG/1
0.4 CAPSULE ORAL DAILY
Qty: 90 CAPSULE | Refills: 0 | Status: SHIPPED | OUTPATIENT
Start: 2024-04-30 | End: 2024-07-29

## 2024-04-30 RX ORDER — CYCLOBENZAPRINE HCL 10 MG
10 TABLET ORAL 3 TIMES DAILY PRN
Qty: 90 TABLET | Refills: 2 | Status: SHIPPED | OUTPATIENT
Start: 2024-04-30 | End: 2024-07-29

## 2024-04-30 NOTE — PROGRESS NOTES
"Subjective   Patient ID: Domingo Conrad is a 63 y.o. male who presents for Med Refill (Is fasting for labs.).    HPI     Pt is here for refills of medications to treat the following medical conditions. Unless otherwise stated, these conditions are well-controlled, pt is taking medications as Rx, and there are no reported side effects to medications or other treatment: GOUT, Hyperlipidemia, Hypertension, Depression, Chronic low back pain, DM2 - States he bases his Lantus on a sliding scale is now averaging between 120-150 units nightly.     Review of Systems   All other systems reviewed and are negative.      Objective   /70   Pulse 81   Temp 36.7 °C (98.1 °F)   Ht 1.88 m (6' 2\")   Wt 125 kg (276 lb)   SpO2 98%   BMI 35.44 kg/m²     Physical Exam  Vitals reviewed.   Constitutional:       General: He is awake.      Appearance: Normal appearance. He is well-developed.   HENT:      Head: Normocephalic and atraumatic.   Cardiovascular:      Rate and Rhythm: Normal rate.   Pulmonary:      Effort: Pulmonary effort is normal.   Musculoskeletal:      Cervical back: Full passive range of motion without pain.      Right lower leg: No edema.      Left lower leg: No edema.   Skin:     General: Skin is warm and dry.      Findings: No lesion or rash.   Neurological:      General: No focal deficit present.      Mental Status: He is alert and oriented to person, place, and time.      Cranial Nerves: No facial asymmetry.      Motor: Motor function is intact.      Gait: Gait is intact.   Psychiatric:         Attention and Perception: Attention normal.         Mood and Affect: Mood and affect normal.         Assessment/Plan   Diagnoses and all orders for this visit:  Type 2 diabetes mellitus without complication, with long-term current use of insulin (Multi)  -     Comprehensive metabolic panel; Future  -     Hemoglobin A1c; Future  -     SITagliptin phosphate (Januvia) 100 mg tablet; Take 1 tablet (100 mg) by mouth once " daily.  -     metFORMIN (Glucophage) 1,000 mg tablet; Take 1 tablet (1,000 mg) by mouth 2 times a day.  -     exenatide microspheres (Bydureon BCise) 2 mg/0.85 mL auto-injector; Inject 1 Syringe under the skin 1 (one) time per week. Dispense  -     insulin glargine (Lantus Solostar U-100 Insulin) 100 unit/mL (3 mL) pen; Inject 120 Units under the skin once daily at bedtime.  Prostate enlargement  -     tamsulosin (Flomax) 0.4 mg 24 hr capsule; Take 1 capsule (0.4 mg) by mouth once daily.  Gastroesophageal reflux disease with esophagitis without hemorrhage  -     pantoprazole (ProtoNix) 40 mg EC tablet; Take 1 tablet (40 mg) by mouth once daily.  Insomnia, unspecified type  -     mirtazapine (Remeron) 30 mg tablet; Take 1 tablet (30 mg) by mouth once daily at bedtime.  Hypertension, unspecified type  -     metoprolol tartrate (Lopressor) 50 mg tablet; Take 1 tablet by mouth 2 times a day.  Depression, unspecified depression type  -     escitalopram (Lexapro) 20 mg tablet; Take 1 tablet (20 mg) by mouth once daily.  -     ARIPiprazole (Abilify) 5 mg tablet; Take 1 tablet (5 mg) by mouth once daily.  Chronic low back pain, unspecified back pain laterality, unspecified whether sciatica present  -     cyclobenzaprine (Flexeril) 10 mg tablet; Take 1 tablet (10 mg) by mouth 3 times a day as needed for muscle spasms.  Hyperlipidemia, unspecified hyperlipidemia type  -     atorvastatin (Lipitor) 40 mg tablet; Take 1 tablet (40 mg) by mouth once daily.  Gout, unspecified cause, unspecified chronicity, unspecified site  -     allopurinol (Zyloprim) 300 mg tablet; Take 1 tablet (300 mg) by mouth once daily.    Chronic medications refilled x 3 months. Labs obtained as above. RTC 3 months or as indicated based on labs. Did confirm pt's understanding of sliding scale Lantus - states that he is decreasing by 2 units when sugar is below 90 and increasing if sugar is above 140. States that sugar is usually between  in the  mornings -- confirmed that he is NOT increasing or decreasing his Lantus when numbers are in this range. Pt did confirm that that was correct.

## 2024-05-03 LAB
EST. AVERAGE GLUCOSE BLD GHB EST-MCNC: 117 MG/DL
HBA1C MFR BLD: 5.7 %

## 2024-05-07 DIAGNOSIS — E11.9 TYPE 2 DIABETES MELLITUS WITHOUT COMPLICATION, WITH LONG-TERM CURRENT USE OF INSULIN (MULTI): ICD-10-CM

## 2024-05-07 DIAGNOSIS — Z79.4 TYPE 2 DIABETES MELLITUS WITHOUT COMPLICATION, WITH LONG-TERM CURRENT USE OF INSULIN (MULTI): ICD-10-CM

## 2024-05-07 RX ORDER — EXENATIDE 2 MG/.85ML
1 INJECTION, SUSPENSION, EXTENDED RELEASE SUBCUTANEOUS
Qty: 10.2 ML | Refills: 0 | Status: SHIPPED | OUTPATIENT
Start: 2024-05-12 | End: 2024-08-10

## 2024-05-07 NOTE — PROGRESS NOTES
Pharmacy requested clarification regarding quantity of pens.  Sent as 12ml corrected to 12 pens. tls

## 2024-05-17 DIAGNOSIS — I10 HYPERTENSION, UNSPECIFIED TYPE: ICD-10-CM

## 2024-05-20 RX ORDER — HYDROCHLOROTHIAZIDE 25 MG/1
25 TABLET ORAL DAILY
Qty: 90 TABLET | Refills: 0 | Status: SHIPPED | OUTPATIENT
Start: 2024-05-20

## 2024-05-20 RX ORDER — METOPROLOL TARTRATE 50 MG/1
50 TABLET ORAL 2 TIMES DAILY
Qty: 180 TABLET | Refills: 0 | Status: SHIPPED | OUTPATIENT
Start: 2024-05-20

## 2024-06-02 DIAGNOSIS — E11.9 TYPE 2 DIABETES MELLITUS WITHOUT COMPLICATION, WITH LONG-TERM CURRENT USE OF INSULIN (MULTI): ICD-10-CM

## 2024-06-02 DIAGNOSIS — Z79.4 TYPE 2 DIABETES MELLITUS WITHOUT COMPLICATION, WITH LONG-TERM CURRENT USE OF INSULIN (MULTI): ICD-10-CM

## 2024-06-04 RX ORDER — PEN NEEDLE, DIABETIC 31 GX5/16"
NEEDLE, DISPOSABLE MISCELLANEOUS
Qty: 100 EACH | Refills: 11 | Status: SHIPPED | OUTPATIENT
Start: 2024-06-04

## 2024-06-10 DIAGNOSIS — E11.9 TYPE 2 DIABETES MELLITUS WITHOUT COMPLICATION, WITH LONG-TERM CURRENT USE OF INSULIN (MULTI): ICD-10-CM

## 2024-06-10 DIAGNOSIS — Z79.4 TYPE 2 DIABETES MELLITUS WITHOUT COMPLICATION, WITH LONG-TERM CURRENT USE OF INSULIN (MULTI): ICD-10-CM

## 2024-06-11 RX ORDER — EXENATIDE 2 MG/.85ML
INJECTION, SUSPENSION, EXTENDED RELEASE SUBCUTANEOUS
Qty: 10.2 ML | Refills: 0 | OUTPATIENT
Start: 2024-06-11

## 2024-06-15 DIAGNOSIS — E11.9 TYPE 2 DIABETES MELLITUS WITHOUT COMPLICATION, WITH LONG-TERM CURRENT USE OF INSULIN (MULTI): ICD-10-CM

## 2024-06-15 DIAGNOSIS — Z79.4 TYPE 2 DIABETES MELLITUS WITHOUT COMPLICATION, WITH LONG-TERM CURRENT USE OF INSULIN (MULTI): ICD-10-CM

## 2024-06-17 RX ORDER — EXENATIDE 2 MG/.85ML
INJECTION, SUSPENSION, EXTENDED RELEASE SUBCUTANEOUS
Qty: 10.2 ML | Refills: 0 | OUTPATIENT
Start: 2024-06-17

## 2024-07-14 DIAGNOSIS — E11.9 TYPE 2 DIABETES MELLITUS WITHOUT COMPLICATION, WITH LONG-TERM CURRENT USE OF INSULIN (MULTI): ICD-10-CM

## 2024-07-14 DIAGNOSIS — Z79.4 TYPE 2 DIABETES MELLITUS WITHOUT COMPLICATION, WITH LONG-TERM CURRENT USE OF INSULIN (MULTI): ICD-10-CM

## 2024-07-15 RX ORDER — INSULIN GLARGINE 100 [IU]/ML
120 INJECTION, SOLUTION SUBCUTANEOUS NIGHTLY
Qty: 36 ML | Refills: 0 | OUTPATIENT
Start: 2024-07-15 | End: 2024-10-13

## 2024-07-16 DIAGNOSIS — E11.9 TYPE 2 DIABETES MELLITUS WITHOUT COMPLICATION, WITH LONG-TERM CURRENT USE OF INSULIN (MULTI): ICD-10-CM

## 2024-07-16 DIAGNOSIS — Z79.4 TYPE 2 DIABETES MELLITUS WITHOUT COMPLICATION, WITH LONG-TERM CURRENT USE OF INSULIN (MULTI): ICD-10-CM

## 2024-07-16 NOTE — TELEPHONE ENCOUNTER
Patient called requesting a refill of Lantus, has increased his unite from 120units to 150units. Reviewed with provider, order placed. Please approve.

## 2024-07-18 RX ORDER — INSULIN GLARGINE 100 [IU]/ML
150 INJECTION, SOLUTION SUBCUTANEOUS NIGHTLY
Qty: 45 ML | Refills: 0 | Status: SHIPPED | OUTPATIENT
Start: 2024-07-18 | End: 2024-08-17

## 2024-07-30 ENCOUNTER — APPOINTMENT (OUTPATIENT)
Dept: PRIMARY CARE | Facility: CLINIC | Age: 64
End: 2024-07-30
Payer: COMMERCIAL

## 2024-07-30 ENCOUNTER — LAB (OUTPATIENT)
Dept: LAB | Facility: LAB | Age: 64
End: 2024-07-30
Payer: COMMERCIAL

## 2024-07-30 VITALS
DIASTOLIC BLOOD PRESSURE: 66 MMHG | WEIGHT: 277.2 LBS | SYSTOLIC BLOOD PRESSURE: 118 MMHG | HEIGHT: 74 IN | OXYGEN SATURATION: 95 % | BODY MASS INDEX: 35.58 KG/M2 | TEMPERATURE: 97.9 F | HEART RATE: 82 BPM

## 2024-07-30 DIAGNOSIS — E11.9 TYPE 2 DIABETES MELLITUS WITHOUT COMPLICATION, WITH LONG-TERM CURRENT USE OF INSULIN (MULTI): Primary | ICD-10-CM

## 2024-07-30 DIAGNOSIS — I10 HYPERTENSION, UNSPECIFIED TYPE: ICD-10-CM

## 2024-07-30 DIAGNOSIS — Z12.11 SCREENING FOR COLON CANCER: ICD-10-CM

## 2024-07-30 DIAGNOSIS — E78.5 HYPERLIPIDEMIA, UNSPECIFIED HYPERLIPIDEMIA TYPE: ICD-10-CM

## 2024-07-30 DIAGNOSIS — N40.0 PROSTATE ENLARGEMENT: ICD-10-CM

## 2024-07-30 DIAGNOSIS — Z11.59 NEED FOR HEPATITIS C SCREENING TEST: ICD-10-CM

## 2024-07-30 DIAGNOSIS — M54.50 CHRONIC LOW BACK PAIN, UNSPECIFIED BACK PAIN LATERALITY, UNSPECIFIED WHETHER SCIATICA PRESENT: ICD-10-CM

## 2024-07-30 DIAGNOSIS — F32.A DEPRESSION, UNSPECIFIED DEPRESSION TYPE: ICD-10-CM

## 2024-07-30 DIAGNOSIS — K21.00 GASTROESOPHAGEAL REFLUX DISEASE WITH ESOPHAGITIS WITHOUT HEMORRHAGE: ICD-10-CM

## 2024-07-30 DIAGNOSIS — Z79.4 TYPE 2 DIABETES MELLITUS WITHOUT COMPLICATION, WITH LONG-TERM CURRENT USE OF INSULIN (MULTI): ICD-10-CM

## 2024-07-30 DIAGNOSIS — G89.29 CHRONIC LOW BACK PAIN, UNSPECIFIED BACK PAIN LATERALITY, UNSPECIFIED WHETHER SCIATICA PRESENT: ICD-10-CM

## 2024-07-30 DIAGNOSIS — G47.00 INSOMNIA, UNSPECIFIED TYPE: ICD-10-CM

## 2024-07-30 DIAGNOSIS — M10.9 GOUT, UNSPECIFIED CAUSE, UNSPECIFIED CHRONICITY, UNSPECIFIED SITE: ICD-10-CM

## 2024-07-30 DIAGNOSIS — E11.9 TYPE 2 DIABETES MELLITUS WITHOUT COMPLICATION, WITH LONG-TERM CURRENT USE OF INSULIN (MULTI): ICD-10-CM

## 2024-07-30 DIAGNOSIS — Z79.4 TYPE 2 DIABETES MELLITUS WITHOUT COMPLICATION, WITH LONG-TERM CURRENT USE OF INSULIN (MULTI): Primary | ICD-10-CM

## 2024-07-30 LAB
ALBUMIN SERPL BCP-MCNC: 4.2 G/DL (ref 3.4–5)
ALP SERPL-CCNC: 78 U/L (ref 33–136)
ALT SERPL W P-5'-P-CCNC: 22 U/L (ref 10–52)
ANION GAP SERPL CALC-SCNC: 12 MMOL/L (ref 10–20)
AST SERPL W P-5'-P-CCNC: 22 U/L (ref 9–39)
BILIRUB SERPL-MCNC: 0.4 MG/DL (ref 0–1.2)
BUN SERPL-MCNC: 23 MG/DL (ref 6–23)
CALCIUM SERPL-MCNC: 9.1 MG/DL (ref 8.6–10.3)
CHLORIDE SERPL-SCNC: 104 MMOL/L (ref 98–107)
CHOLEST SERPL-MCNC: 106 MG/DL (ref 0–199)
CHOLESTEROL/HDL RATIO: 2.8
CO2 SERPL-SCNC: 28 MMOL/L (ref 21–32)
CREAT SERPL-MCNC: 1.09 MG/DL (ref 0.5–1.3)
CREAT UR-MCNC: 128.4 MG/DL (ref 20–370)
EGFRCR SERPLBLD CKD-EPI 2021: 75 ML/MIN/1.73M*2
ERYTHROCYTE [DISTWIDTH] IN BLOOD BY AUTOMATED COUNT: 13.8 % (ref 11.5–14.5)
GLUCOSE SERPL-MCNC: 98 MG/DL (ref 74–99)
HCT VFR BLD AUTO: 42.1 % (ref 41–52)
HCV AB SER QL: NONREACTIVE
HDLC SERPL-MCNC: 38.1 MG/DL
HGB BLD-MCNC: 14 G/DL (ref 13.5–17.5)
LDLC SERPL CALC-MCNC: 46 MG/DL
MCH RBC QN AUTO: 30.2 PG (ref 26–34)
MCHC RBC AUTO-ENTMCNC: 33.3 G/DL (ref 32–36)
MCV RBC AUTO: 91 FL (ref 80–100)
MICROALBUMIN UR-MCNC: <7 MG/L
MICROALBUMIN/CREAT UR: NORMAL MG/G{CREAT}
NON HDL CHOLESTEROL: 68 MG/DL (ref 0–149)
NRBC BLD-RTO: 0 /100 WBCS (ref 0–0)
PLATELET # BLD AUTO: 236 X10*3/UL (ref 150–450)
POTASSIUM SERPL-SCNC: 3.8 MMOL/L (ref 3.5–5.3)
PROT SERPL-MCNC: 6.2 G/DL (ref 6.4–8.2)
PSA SERPL-MCNC: 1.42 NG/ML
RBC # BLD AUTO: 4.63 X10*6/UL (ref 4.5–5.9)
SODIUM SERPL-SCNC: 140 MMOL/L (ref 136–145)
TRIGL SERPL-MCNC: 110 MG/DL (ref 0–149)
URATE SERPL-MCNC: 4.8 MG/DL (ref 4–7.5)
VLDL: 22 MG/DL (ref 0–40)
WBC # BLD AUTO: 10.4 X10*3/UL (ref 4.4–11.3)

## 2024-07-30 PROCEDURE — 82570 ASSAY OF URINE CREATININE: CPT

## 2024-07-30 PROCEDURE — 82043 UR ALBUMIN QUANTITATIVE: CPT

## 2024-07-30 PROCEDURE — 36415 COLL VENOUS BLD VENIPUNCTURE: CPT

## 2024-07-30 PROCEDURE — 84550 ASSAY OF BLOOD/URIC ACID: CPT

## 2024-07-30 PROCEDURE — 84153 ASSAY OF PSA TOTAL: CPT

## 2024-07-30 PROCEDURE — 85027 COMPLETE CBC AUTOMATED: CPT

## 2024-07-30 PROCEDURE — 3008F BODY MASS INDEX DOCD: CPT | Performed by: FAMILY MEDICINE

## 2024-07-30 PROCEDURE — 1159F MED LIST DOCD IN RCRD: CPT | Performed by: FAMILY MEDICINE

## 2024-07-30 PROCEDURE — 3078F DIAST BP <80 MM HG: CPT | Performed by: FAMILY MEDICINE

## 2024-07-30 PROCEDURE — 3062F POS MACROALBUMINURIA REV: CPT | Performed by: FAMILY MEDICINE

## 2024-07-30 PROCEDURE — 1158F ADVNC CARE PLAN TLK DOCD: CPT | Performed by: FAMILY MEDICINE

## 2024-07-30 PROCEDURE — 3044F HG A1C LEVEL LT 7.0%: CPT | Performed by: FAMILY MEDICINE

## 2024-07-30 PROCEDURE — 3074F SYST BP LT 130 MM HG: CPT | Performed by: FAMILY MEDICINE

## 2024-07-30 PROCEDURE — 1036F TOBACCO NON-USER: CPT | Performed by: FAMILY MEDICINE

## 2024-07-30 PROCEDURE — 80061 LIPID PANEL: CPT

## 2024-07-30 PROCEDURE — 3048F LDL-C <100 MG/DL: CPT | Performed by: FAMILY MEDICINE

## 2024-07-30 PROCEDURE — 86803 HEPATITIS C AB TEST: CPT

## 2024-07-30 PROCEDURE — 80053 COMPREHEN METABOLIC PANEL: CPT

## 2024-07-30 PROCEDURE — 1123F ACP DISCUSS/DSCN MKR DOCD: CPT | Performed by: FAMILY MEDICINE

## 2024-07-30 PROCEDURE — 83036 HEMOGLOBIN GLYCOSYLATED A1C: CPT

## 2024-07-30 PROCEDURE — 99215 OFFICE O/P EST HI 40 MIN: CPT | Performed by: FAMILY MEDICINE

## 2024-07-30 RX ORDER — TAMSULOSIN HYDROCHLORIDE 0.4 MG/1
0.4 CAPSULE ORAL DAILY
Qty: 90 CAPSULE | Refills: 0 | Status: SHIPPED | OUTPATIENT
Start: 2024-07-30 | End: 2024-10-28

## 2024-07-30 RX ORDER — PANTOPRAZOLE SODIUM 40 MG/1
40 TABLET, DELAYED RELEASE ORAL DAILY
Qty: 90 TABLET | Refills: 0 | Status: SHIPPED | OUTPATIENT
Start: 2024-07-30 | End: 2024-10-28

## 2024-07-30 RX ORDER — ARIPIPRAZOLE 5 MG/1
5 TABLET ORAL DAILY
Qty: 90 TABLET | Refills: 0 | Status: SHIPPED | OUTPATIENT
Start: 2024-07-30 | End: 2024-10-28

## 2024-07-30 RX ORDER — CYCLOBENZAPRINE HCL 10 MG
10 TABLET ORAL 3 TIMES DAILY PRN
Qty: 90 TABLET | Refills: 2 | Status: SHIPPED | OUTPATIENT
Start: 2024-07-30 | End: 2024-10-28

## 2024-07-30 RX ORDER — METOPROLOL TARTRATE 50 MG/1
50 TABLET ORAL 2 TIMES DAILY
Qty: 180 TABLET | Refills: 0 | Status: SHIPPED | OUTPATIENT
Start: 2024-07-30

## 2024-07-30 RX ORDER — INSULIN GLARGINE 100 [IU]/ML
150 INJECTION, SOLUTION SUBCUTANEOUS NIGHTLY
Qty: 45 ML | Refills: 0 | Status: SHIPPED | OUTPATIENT
Start: 2024-07-30 | End: 2024-08-29

## 2024-07-30 RX ORDER — ATORVASTATIN CALCIUM 40 MG/1
40 TABLET, FILM COATED ORAL DAILY
Qty: 90 TABLET | Refills: 0 | Status: SHIPPED | OUTPATIENT
Start: 2024-07-30 | End: 2024-10-28

## 2024-07-30 RX ORDER — EXENATIDE 2 MG/.85ML
1 INJECTION, SUSPENSION, EXTENDED RELEASE SUBCUTANEOUS
Qty: 10.2 ML | Refills: 0 | Status: SHIPPED | OUTPATIENT
Start: 2024-08-04 | End: 2024-11-02

## 2024-07-30 RX ORDER — ESCITALOPRAM OXALATE 20 MG/1
20 TABLET ORAL DAILY
Qty: 90 TABLET | Refills: 0 | Status: SHIPPED | OUTPATIENT
Start: 2024-07-30 | End: 2024-10-28

## 2024-07-30 RX ORDER — HYDROCHLOROTHIAZIDE 25 MG/1
25 TABLET ORAL DAILY
Qty: 90 TABLET | Refills: 0 | Status: SHIPPED | OUTPATIENT
Start: 2024-07-30

## 2024-07-30 RX ORDER — MIRTAZAPINE 30 MG/1
30 TABLET, FILM COATED ORAL NIGHTLY
Qty: 90 TABLET | Refills: 0 | Status: SHIPPED | OUTPATIENT
Start: 2024-07-30 | End: 2024-10-28

## 2024-07-30 RX ORDER — METFORMIN HYDROCHLORIDE 1000 MG/1
1000 TABLET ORAL 2 TIMES DAILY
Qty: 180 TABLET | Refills: 0 | Status: SHIPPED | OUTPATIENT
Start: 2024-07-30 | End: 2024-10-28

## 2024-07-30 RX ORDER — ALLOPURINOL 300 MG/1
300 TABLET ORAL DAILY
Qty: 90 TABLET | Refills: 0 | Status: SHIPPED | OUTPATIENT
Start: 2024-07-30 | End: 2024-10-28

## 2024-07-30 ASSESSMENT — PATIENT HEALTH QUESTIONNAIRE - PHQ9
SUM OF ALL RESPONSES TO PHQ9 QUESTIONS 1 AND 2: 0
1. LITTLE INTEREST OR PLEASURE IN DOING THINGS: NOT AT ALL
2. FEELING DOWN, DEPRESSED OR HOPELESS: NOT AT ALL

## 2024-07-30 NOTE — PROGRESS NOTES
"Subjective   Patient ID: Domingo Conrad is a 64 y.o. male who presents for Med Refill.    HPI Pt is here for refills of medications to treat the following medical conditions. Unless otherwise stated, these conditions are well-controlled, pt is taking medications s Rx, and there are no reported side effects to medications or other treatment: chronic low back pain, depression, GERD, gout, hyperlipidemia, HTN, Prostate enlargement, Insomnia, DM2    Patient is fasting for blood work.     Review of Systems   All other systems reviewed and are negative.      Objective   /66   Pulse 82   Temp 36.6 °C (97.9 °F) (Oral)   Ht 1.88 m (6' 2\")   Wt 126 kg (277 lb 3.2 oz)   SpO2 95%   BMI 35.59 kg/m²     Physical Exam  Constitutional:       Appearance: Normal appearance.   Eyes:      Conjunctiva/sclera: Conjunctivae normal.   Cardiovascular:      Rate and Rhythm: Normal rate and regular rhythm.   Pulmonary:      Effort: Pulmonary effort is normal.      Breath sounds: Normal breath sounds.   Abdominal:      Palpations: Abdomen is soft.   Musculoskeletal:         General: Normal range of motion.   Skin:     General: Skin is warm and dry.   Neurological:      Mental Status: He is alert.   Psychiatric:         Mood and Affect: Mood normal.         Assessment/Plan   Diagnoses and all orders for this visit:  Type 2 diabetes mellitus without complication, with long-term current use of insulin (Multi)  -     insulin glargine (Lantus Solostar U-100 Insulin) 100 unit/mL (3 mL) pen; Inject 150 Units under the skin once daily at bedtime.  -     exenatide microspheres (Bydureon BCise) 2 mg/0.85 mL auto-injector; Inject 1 Syringe under the skin 1 (one) time per week.  -     metFORMIN (Glucophage) 1,000 mg tablet; Take 1 tablet (1,000 mg) by mouth 2 times a day.  -     SITagliptin phosphate (Januvia) 100 mg tablet; Take 1 tablet (100 mg) by mouth once daily.  -     Albumin-Creatinine Ratio, Urine Random; Future  -     Hemoglobin A1c; " Future  -     Comprehensive metabolic panel; Future  -     Referral to Podiatry; Future  Chronic low back pain, unspecified back pain laterality, unspecified whether sciatica present  -     cyclobenzaprine (Flexeril) 10 mg tablet; Take 1 tablet (10 mg) by mouth 3 times a day as needed for muscle spasms.  Depression, unspecified depression type  -     ARIPiprazole (Abilify) 5 mg tablet; Take 1 tablet (5 mg) by mouth once daily.  -     escitalopram (Lexapro) 20 mg tablet; Take 1 tablet (20 mg) by mouth once daily.  Gastroesophageal reflux disease with esophagitis without hemorrhage  -     pantoprazole (ProtoNix) 40 mg EC tablet; Take 1 tablet (40 mg) by mouth once daily.  Gout, unspecified cause, unspecified chronicity, unspecified site  -     allopurinol (Zyloprim) 300 mg tablet; Take 1 tablet (300 mg) by mouth once daily.  -     Uric acid; Future  Hyperlipidemia, unspecified hyperlipidemia type  -     atorvastatin (Lipitor) 40 mg tablet; Take 1 tablet (40 mg) by mouth once daily.  -     Lipid panel; Future  Hypertension, unspecified type  -     hydroCHLOROthiazide (HYDRODiuril) 25 mg tablet; Take 1 tablet (25 mg) by mouth once daily.  -     metoprolol tartrate (Lopressor) 50 mg tablet; Take 1 tablet by mouth 2 times a day.  -     CBC; Future  Insomnia, unspecified type  -     mirtazapine (Remeron) 30 mg tablet; Take 1 tablet (30 mg) by mouth once daily at bedtime.  Prostate enlargement  -     tamsulosin (Flomax) 0.4 mg 24 hr capsule; Take 1 capsule (0.4 mg) by mouth once daily.  -     Prostate Spec.Ag,Screen; Future  Need for hepatitis C screening test  -     Hepatitis C antibody; Future  Screening for colon cancer  -     Cologuard® colon cancer screening; Future

## 2024-07-31 LAB
EST. AVERAGE GLUCOSE BLD GHB EST-MCNC: 137 MG/DL
HBA1C MFR BLD: 6.4 %

## 2024-08-06 ENCOUNTER — APPOINTMENT (OUTPATIENT)
Dept: PRIMARY CARE | Facility: CLINIC | Age: 64
End: 2024-08-06
Payer: COMMERCIAL

## 2024-08-06 VITALS
BODY MASS INDEX: 39.8 KG/M2 | HEIGHT: 70 IN | WEIGHT: 278 LBS | DIASTOLIC BLOOD PRESSURE: 70 MMHG | SYSTOLIC BLOOD PRESSURE: 122 MMHG | HEART RATE: 84 BPM | TEMPERATURE: 98.9 F | OXYGEN SATURATION: 95 %

## 2024-08-06 DIAGNOSIS — M54.50 CHRONIC BILATERAL LOW BACK PAIN WITHOUT SCIATICA: ICD-10-CM

## 2024-08-06 DIAGNOSIS — G89.29 CHRONIC BILATERAL LOW BACK PAIN WITHOUT SCIATICA: ICD-10-CM

## 2024-08-06 DIAGNOSIS — L60.3 NAIL DYSTROPHY: ICD-10-CM

## 2024-08-06 DIAGNOSIS — Z00.00 ROUTINE GENERAL MEDICAL EXAMINATION AT A HEALTH CARE FACILITY: Primary | ICD-10-CM

## 2024-08-06 PROCEDURE — 3078F DIAST BP <80 MM HG: CPT | Performed by: FAMILY MEDICINE

## 2024-08-06 PROCEDURE — G0439 PPPS, SUBSEQ VISIT: HCPCS | Performed by: FAMILY MEDICINE

## 2024-08-06 PROCEDURE — 1036F TOBACCO NON-USER: CPT | Performed by: FAMILY MEDICINE

## 2024-08-06 PROCEDURE — 99214 OFFICE O/P EST MOD 30 MIN: CPT | Performed by: FAMILY MEDICINE

## 2024-08-06 PROCEDURE — 3048F LDL-C <100 MG/DL: CPT | Performed by: FAMILY MEDICINE

## 2024-08-06 PROCEDURE — 3062F POS MACROALBUMINURIA REV: CPT | Performed by: FAMILY MEDICINE

## 2024-08-06 PROCEDURE — 1159F MED LIST DOCD IN RCRD: CPT | Performed by: FAMILY MEDICINE

## 2024-08-06 PROCEDURE — 3008F BODY MASS INDEX DOCD: CPT | Performed by: FAMILY MEDICINE

## 2024-08-06 PROCEDURE — 3074F SYST BP LT 130 MM HG: CPT | Performed by: FAMILY MEDICINE

## 2024-08-06 PROCEDURE — 3044F HG A1C LEVEL LT 7.0%: CPT | Performed by: FAMILY MEDICINE

## 2024-08-06 PROCEDURE — 99397 PER PM REEVAL EST PAT 65+ YR: CPT | Performed by: FAMILY MEDICINE

## 2024-08-06 PROCEDURE — 1170F FXNL STATUS ASSESSED: CPT | Performed by: FAMILY MEDICINE

## 2024-08-06 ASSESSMENT — ACTIVITIES OF DAILY LIVING (ADL)
DOING_HOUSEWORK: INDEPENDENT
TAKING_MEDICATION: INDEPENDENT
MANAGING_FINANCES: INDEPENDENT
GROCERY_SHOPPING: INDEPENDENT
BATHING: INDEPENDENT
DRESSING: INDEPENDENT

## 2024-08-06 ASSESSMENT — PATIENT HEALTH QUESTIONNAIRE - PHQ9
10. IF YOU CHECKED OFF ANY PROBLEMS, HOW DIFFICULT HAVE THESE PROBLEMS MADE IT FOR YOU TO DO YOUR WORK, TAKE CARE OF THINGS AT HOME, OR GET ALONG WITH OTHER PEOPLE: SOMEWHAT DIFFICULT
9. THOUGHTS THAT YOU WOULD BE BETTER OFF DEAD, OR OF HURTING YOURSELF: NOT AT ALL
SUM OF ALL RESPONSES TO PHQ QUESTIONS 1-9: 8
5. POOR APPETITE OR OVEREATING: SEVERAL DAYS
7. TROUBLE CONCENTRATING ON THINGS, SUCH AS READING THE NEWSPAPER OR WATCHING TELEVISION: NOT AT ALL
2. FEELING DOWN, DEPRESSED OR HOPELESS: NEARLY EVERY DAY
1. LITTLE INTEREST OR PLEASURE IN DOING THINGS: NOT AT ALL
8. MOVING OR SPEAKING SO SLOWLY THAT OTHER PEOPLE COULD HAVE NOTICED. OR THE OPPOSITE, BEING SO FIGETY OR RESTLESS THAT YOU HAVE BEEN MOVING AROUND A LOT MORE THAN USUAL: SEVERAL DAYS
6. FEELING BAD ABOUT YOURSELF - OR THAT YOU ARE A FAILURE OR HAVE LET YOURSELF OR YOUR FAMILY DOWN: NOT AT ALL
3. TROUBLE FALLING OR STAYING ASLEEP OR SLEEPING TOO MUCH: NEARLY EVERY DAY
SUM OF ALL RESPONSES TO PHQ9 QUESTIONS 1 AND 2: 3
4. FEELING TIRED OR HAVING LITTLE ENERGY: NOT AT ALL

## 2024-08-06 ASSESSMENT — VISUAL ACUITY
OS_CC: 20/25
OD_CC: 20/25

## 2024-08-06 ASSESSMENT — ENCOUNTER SYMPTOMS
LOSS OF SENSATION IN FEET: 0
DEPRESSION: 1
OCCASIONAL FEELINGS OF UNSTEADINESS: 0

## 2024-08-06 NOTE — PROGRESS NOTES
"Subjective   Reason for Visit: Domingo Conrad is an 64 y.o. male here for a annual wellness visit.       Reviewed all medications by prescribing practitioner or clinical pharmacist (such as prescriptions, OTCs, herbal therapies and supplements) and documented in the medical record.    HPI Patient is here for Medicare Well exam. Patient had labs drawn prior to office visit.     He has not had a pneumococcal vaccine, cannot remember when his last TDaP was.     Patient has no family history of colon or prostate cancers, but does have enlarged prostate.     He has not had a diabetic foot or eye exam done, has order for foot exam placed, has not called. He received Cologuard in the mail and will complete.           Patient Care Team:  Miko Gill DO as PCP - General     Review of Systems   All other systems reviewed and are negative.      Objective   Vitals:  /70   Pulse 84   Temp 37.2 °C (98.9 °F) (Oral)   Ht 1.784 m (5' 10.25\")   Wt 126 kg (278 lb)   SpO2 95%   BMI 39.61 kg/m²       Physical Exam  Constitutional:       Appearance: Normal appearance.   Eyes:      Conjunctiva/sclera: Conjunctivae normal.   Cardiovascular:      Rate and Rhythm: Normal rate and regular rhythm.   Pulmonary:      Effort: Pulmonary effort is normal.      Breath sounds: Normal breath sounds.   Abdominal:      Palpations: Abdomen is soft.   Musculoskeletal:         General: Normal range of motion.   Skin:     General: Skin is warm and dry.   Neurological:      Mental Status: He is alert.   Psychiatric:         Mood and Affect: Mood normal.         Assessment/Plan E  Problem List Items Addressed This Visit             ICD-10-CM    Chronic low back pain M54.50, G89.29    Relevant Orders    XR lumbar spine complete 4+ views     Other Visit Diagnoses         Codes    Routine general medical examination at a health care facility    -  Primary Z00.00    Nail dystrophy     L60.3    Relevant Orders    Referral to Podiatry      "          Essentially normal well exam. Reviewed labs with pt  Chronic medical conditions well controlled  Refer to podiatry for diabetic foot care and nail care  RTC 1-2 weeks for Xray review -- back pain

## 2024-08-12 ENCOUNTER — HOSPITAL ENCOUNTER (OUTPATIENT)
Dept: RADIOLOGY | Facility: CLINIC | Age: 64
Discharge: HOME | End: 2024-08-12
Payer: COMMERCIAL

## 2024-08-12 DIAGNOSIS — G89.29 CHRONIC BILATERAL LOW BACK PAIN WITHOUT SCIATICA: ICD-10-CM

## 2024-08-12 DIAGNOSIS — M54.50 CHRONIC BILATERAL LOW BACK PAIN WITHOUT SCIATICA: ICD-10-CM

## 2024-08-12 PROCEDURE — 72110 X-RAY EXAM L-2 SPINE 4/>VWS: CPT

## 2024-08-12 PROCEDURE — 72110 X-RAY EXAM L-2 SPINE 4/>VWS: CPT | Performed by: RADIOLOGY

## 2024-08-27 ENCOUNTER — APPOINTMENT (OUTPATIENT)
Dept: PRIMARY CARE | Facility: CLINIC | Age: 64
End: 2024-08-27
Payer: COMMERCIAL

## 2024-08-27 VITALS
OXYGEN SATURATION: 98 % | BODY MASS INDEX: 40 KG/M2 | SYSTOLIC BLOOD PRESSURE: 130 MMHG | HEIGHT: 70 IN | WEIGHT: 279.4 LBS | DIASTOLIC BLOOD PRESSURE: 64 MMHG | HEART RATE: 92 BPM

## 2024-08-27 DIAGNOSIS — F33.9 DEPRESSION, RECURRENT (CMS-HCC): ICD-10-CM

## 2024-08-27 DIAGNOSIS — M54.50 CHRONIC BILATERAL LOW BACK PAIN WITHOUT SCIATICA: ICD-10-CM

## 2024-08-27 DIAGNOSIS — E11.69 TYPE 2 DIABETES MELLITUS WITH OTHER SPECIFIED COMPLICATION, WITH LONG-TERM CURRENT USE OF INSULIN (MULTI): Primary | ICD-10-CM

## 2024-08-27 DIAGNOSIS — Z79.4 TYPE 2 DIABETES MELLITUS WITH OTHER SPECIFIED COMPLICATION, WITH LONG-TERM CURRENT USE OF INSULIN (MULTI): Primary | ICD-10-CM

## 2024-08-27 DIAGNOSIS — G89.29 CHRONIC BILATERAL LOW BACK PAIN WITHOUT SCIATICA: ICD-10-CM

## 2024-08-27 DIAGNOSIS — M51.37 DDD (DEGENERATIVE DISC DISEASE), LUMBOSACRAL: ICD-10-CM

## 2024-08-27 DIAGNOSIS — E66.01 MORBID (SEVERE) OBESITY DUE TO EXCESS CALORIES (MULTI): ICD-10-CM

## 2024-08-27 PROCEDURE — 1159F MED LIST DOCD IN RCRD: CPT | Performed by: FAMILY MEDICINE

## 2024-08-27 PROCEDURE — 1123F ACP DISCUSS/DSCN MKR DOCD: CPT | Performed by: FAMILY MEDICINE

## 2024-08-27 PROCEDURE — 3062F POS MACROALBUMINURIA REV: CPT | Performed by: FAMILY MEDICINE

## 2024-08-27 PROCEDURE — 1158F ADVNC CARE PLAN TLK DOCD: CPT | Performed by: FAMILY MEDICINE

## 2024-08-27 PROCEDURE — 3075F SYST BP GE 130 - 139MM HG: CPT | Performed by: FAMILY MEDICINE

## 2024-08-27 PROCEDURE — 99214 OFFICE O/P EST MOD 30 MIN: CPT | Performed by: FAMILY MEDICINE

## 2024-08-27 PROCEDURE — 3078F DIAST BP <80 MM HG: CPT | Performed by: FAMILY MEDICINE

## 2024-08-27 PROCEDURE — 1036F TOBACCO NON-USER: CPT | Performed by: FAMILY MEDICINE

## 2024-08-27 PROCEDURE — 3008F BODY MASS INDEX DOCD: CPT | Performed by: FAMILY MEDICINE

## 2024-08-27 PROCEDURE — 3048F LDL-C <100 MG/DL: CPT | Performed by: FAMILY MEDICINE

## 2024-08-27 PROCEDURE — 3044F HG A1C LEVEL LT 7.0%: CPT | Performed by: FAMILY MEDICINE

## 2024-08-27 ASSESSMENT — PATIENT HEALTH QUESTIONNAIRE - PHQ9
1. LITTLE INTEREST OR PLEASURE IN DOING THINGS: NOT AT ALL
SUM OF ALL RESPONSES TO PHQ9 QUESTIONS 1 AND 2: 0
2. FEELING DOWN, DEPRESSED OR HOPELESS: NOT AT ALL

## 2024-08-27 NOTE — PROGRESS NOTES
"Subjective   Patient ID: Domingo Conrad is a 64 y.o. male who presents for Follow-up.    HPI Patient is here to follow up on his back xrays. He does not have any concerns, does not need any refills.     Review of Systems   All other systems reviewed and are negative.      Objective   /64   Pulse 92   Ht 1.784 m (5' 10.25\")   Wt 127 kg (279 lb 6.4 oz)   SpO2 98%   BMI 39.80 kg/m²     Physical Exam  Constitutional:       Appearance: Normal appearance.   Eyes:      Conjunctiva/sclera: Conjunctivae normal.   Cardiovascular:      Rate and Rhythm: Normal rate and regular rhythm.   Pulmonary:      Effort: Pulmonary effort is normal.      Breath sounds: Normal breath sounds.   Abdominal:      Palpations: Abdomen is soft.   Musculoskeletal:         General: Normal range of motion.   Skin:     General: Skin is warm and dry.   Neurological:      Mental Status: He is alert.   Psychiatric:         Mood and Affect: Mood normal.         Assessment/Plan   Diagnoses and all orders for this visit:  Type 2 diabetes mellitus with other specified complication, with long-term current use of insulin (Multi)  Depression, recurrent ([facility])  Morbid (severe) obesity due to excess calories (Multi)  DDD (degenerative disc disease), lumbosacral  -     Referral to Pain Medicine; Future  Chronic bilateral low back pain without sciatica  -     Referral to Pain Medicine; Future       "

## 2024-09-17 DIAGNOSIS — E11.9 TYPE 2 DIABETES MELLITUS WITHOUT COMPLICATION, WITH LONG-TERM CURRENT USE OF INSULIN (MULTI): ICD-10-CM

## 2024-09-17 DIAGNOSIS — Z79.4 TYPE 2 DIABETES MELLITUS WITHOUT COMPLICATION, WITH LONG-TERM CURRENT USE OF INSULIN (MULTI): ICD-10-CM

## 2024-09-18 RX ORDER — INSULIN GLARGINE 100 [IU]/ML
150 INJECTION, SOLUTION SUBCUTANEOUS NIGHTLY
Qty: 45 ML | Refills: 0 | Status: SHIPPED | OUTPATIENT
Start: 2024-09-18 | End: 2024-10-18

## 2024-10-09 ENCOUNTER — OFFICE VISIT (OUTPATIENT)
Dept: PAIN MEDICINE | Facility: HOSPITAL | Age: 64
End: 2024-10-09
Payer: COMMERCIAL

## 2024-10-09 VITALS
OXYGEN SATURATION: 95 % | BODY MASS INDEX: 39.73 KG/M2 | HEART RATE: 86 BPM | WEIGHT: 283.8 LBS | RESPIRATION RATE: 18 BRPM | DIASTOLIC BLOOD PRESSURE: 85 MMHG | HEIGHT: 71 IN | SYSTOLIC BLOOD PRESSURE: 145 MMHG

## 2024-10-09 DIAGNOSIS — G89.29 CHRONIC BILATERAL LOW BACK PAIN WITHOUT SCIATICA: ICD-10-CM

## 2024-10-09 DIAGNOSIS — M51.379 DDD (DEGENERATIVE DISC DISEASE), LUMBOSACRAL: ICD-10-CM

## 2024-10-09 DIAGNOSIS — M46.1 SACROILIITIS (CMS-HCC): Primary | ICD-10-CM

## 2024-10-09 DIAGNOSIS — M54.50 CHRONIC BILATERAL LOW BACK PAIN WITHOUT SCIATICA: ICD-10-CM

## 2024-10-09 PROCEDURE — 3077F SYST BP >= 140 MM HG: CPT | Performed by: PHYSICAL MEDICINE & REHABILITATION

## 2024-10-09 PROCEDURE — 1036F TOBACCO NON-USER: CPT | Performed by: PHYSICAL MEDICINE & REHABILITATION

## 2024-10-09 PROCEDURE — 99214 OFFICE O/P EST MOD 30 MIN: CPT | Performed by: PHYSICAL MEDICINE & REHABILITATION

## 2024-10-09 PROCEDURE — 99204 OFFICE O/P NEW MOD 45 MIN: CPT | Performed by: PHYSICAL MEDICINE & REHABILITATION

## 2024-10-09 PROCEDURE — 3048F LDL-C <100 MG/DL: CPT | Performed by: PHYSICAL MEDICINE & REHABILITATION

## 2024-10-09 PROCEDURE — 3079F DIAST BP 80-89 MM HG: CPT | Performed by: PHYSICAL MEDICINE & REHABILITATION

## 2024-10-09 PROCEDURE — 3062F POS MACROALBUMINURIA REV: CPT | Performed by: PHYSICAL MEDICINE & REHABILITATION

## 2024-10-09 PROCEDURE — 3008F BODY MASS INDEX DOCD: CPT | Performed by: PHYSICAL MEDICINE & REHABILITATION

## 2024-10-09 PROCEDURE — 1125F AMNT PAIN NOTED PAIN PRSNT: CPT | Performed by: PHYSICAL MEDICINE & REHABILITATION

## 2024-10-09 PROCEDURE — 1159F MED LIST DOCD IN RCRD: CPT | Performed by: PHYSICAL MEDICINE & REHABILITATION

## 2024-10-09 PROCEDURE — 3044F HG A1C LEVEL LT 7.0%: CPT | Performed by: PHYSICAL MEDICINE & REHABILITATION

## 2024-10-09 RX ORDER — DIAZEPAM 5 MG/1
5 TABLET ORAL ONCE AS NEEDED
OUTPATIENT
Start: 2024-10-09

## 2024-10-09 RX ORDER — TIZANIDINE 2 MG/1
2 TABLET ORAL EVERY 8 HOURS PRN
Qty: 90 TABLET | Refills: 2 | Status: SHIPPED | OUTPATIENT
Start: 2024-10-09 | End: 2025-01-07

## 2024-10-09 ASSESSMENT — ENCOUNTER SYMPTOMS
OCCASIONAL FEELINGS OF UNSTEADINESS: 1
LOSS OF SENSATION IN FEET: 0
BACK PAIN: 1
DEPRESSION: 0

## 2024-10-09 ASSESSMENT — PAIN SCALES - GENERAL: PAINLEVEL: 8

## 2024-10-09 NOTE — PROGRESS NOTES
"   New pt c/o back pain    Location of Pain: low back since late 1980's due to fall in a trench 7-8 ft deep. Long periods of standing, bending over, kneeling makes worse. Able to walk dogs daily short distance         Pain Score: 8    Other pain medication/neuromodulator: BenGay oint, flexeril    Therapeutic Goals: walking    Therapeutic Assessment:    Injections and/or Procedures: none    Other: chiropractor after initial injury-did not help, PT, water aerobics-nothing helped  OA 10/9/24  Oswestry screen 10/9/24 score 24    Subjective   HPI: Domingo Conrad is a 64 y.o. male who presents for for chronic lower back pain.  His pain is localized in the lower back and is nonradiating and not associated with weakness.  It is worsening with bending, prolonged standing, extending his back and relieved when he lay back in bed.  He has no history of any back surgery nor any steroid injection.  He has tried physical therapy in the past with minimal improvement.       Office Agreement: 10/9/24  Oswestry screen score 24  Review of Systems   Musculoskeletal:  Positive for back pain.        Current Outpatient Medications   Medication Instructions    allopurinol (ZYLOPRIM) 300 mg, oral, Daily    ARIPiprazole (ABILIFY) 5 mg, oral, Daily    aspirin 81 mg, oral    atorvastatin (LIPITOR) 40 mg, oral, Daily    BD Ultra-Fine Mini Pen Needle 31 gauge x 3/16\" needle use as directed daily with insulin    blood sugar diagnostic (Blood Glucose Test) strip 4 times daily    blood sugar diagnostic (Blood Glucose Test) strip 1 strip, miscellaneous, 4 times daily    blood-glucose meter misc Use as directed    cyclobenzaprine (FLEXERIL) 10 mg, oral, 3 times daily PRN    escitalopram (LEXAPRO) 20 mg, oral, Daily    exenatide microspheres (Bydureon BCise) 2 mg/0.85 mL auto-injector 1 Syringe, subcutaneous, Once Weekly    FreeStyle glucose monitoring kit miscellaneous, As directed    hydroCHLOROthiazide (HYDRODIURIL) 25 mg, oral, Daily    lancets misc " miscellaneous, 2 times daily    lancets misc Test four times daily    Lantus Solostar U-100 Insulin 150 Units, subcutaneous, Nightly    metFORMIN (GLUCOPHAGE) 1,000 mg, oral, 2 times daily    metoprolol tartrate (LOPRESSOR) 50 mg, oral, 2 times daily    mirtazapine (REMERON) 30 mg, oral, Nightly    OneTouch Delica Plus Lancet 33 gauge misc 1 each, 2 times daily    pantoprazole (PROTONIX) 40 mg, oral, Daily    pen needle, diabetic (SURE COMFORT PEN NEEDLE MISC) miscellaneous, 31g x 5mm use as directed daily with insulin    SITagliptin phosphate (JANUVIA) 100 mg, oral, Daily    tamsulosin (FLOMAX) 0.4 mg, oral, Daily        Past Medical History:   Diagnosis Date    Alcohol abuse, uncomplicated     Alcohol abuse    Other specified health status     No pertinent past surgical history        Past Surgical History:   Procedure Laterality Date    OTHER SURGICAL HISTORY  02/23/2022    Lipoma excision        Family History   Problem Relation Name Age of Onset    Other (aortic valve disorder) Father          No Known Allergies     No results found for this or any previous visit from the past 1000 days.      Objective     Vitals:    10/09/24 1413   BP: 145/85   Pulse: 86   Resp: 18   SpO2: 95%        Physical Exam    GENERAL EXAM  Vital Signs: Vital signs to include heart rate, respiration rate, blood pressure, and temperature were reviewed.  General Appearance:  Awake, alert, healthy appearing, well developed, No acute distress.  Head: Normocephalic without evidence of head injury.  Neck: The appearance of the neck was normal without swelling with a midline trachea.  Eyes: The eyelids and eyebrows exhibited no abnormalities.  Pupils were not pin-point.  Sclera was without icterus.  Lungs: Respiration rhythm and depth was normal.  Respiratory movements were normal without labored breathing.  Cardiovascular: No peripheral edema was present.    Neurological: Patient was oriented to time, place, and person.  Speech was normal.   Balance, gait, and stance were unremarkable.    Psychiatric: Appearance was normal with appropriate dress.  Mood was euthymic and affect was normal.  Skin: Affected regions were without ecchymosis or skin lesions.    Back (MSK/neuro/skin):  Full active and passive range of motion in all planes  Strength 5 out of 5 bilateral lower extremities  Sensation to light-touch grossly intact bilateral lower extremities  Deep tendon reflexes equal bilateral lower extremities  No edema/effusion/erythema  Skin: no skin lesions or scars  B SLR (-)  B sacral spring test (-),   B facet load (-)  B SIJ tenderness (-)   B SOLIS (-)  Full flexion/extension  No TTP, no muscle spasm over lumbar paraspinals    Physical exam as above except:  Positive Gaenslen's, Solis, and compression test bilaterally.  Tenderness over bilateral PSIS          Assessment/Plan   Problem List Items Addressed This Visit             ICD-10-CM    Chronic low back pain M54.50, G89.29     Other Visit Diagnoses         Codes    DDD (degenerative disc disease), lumbosacral     M51.379        Domingo Conrad is a 64 y.o. male who presents for for chronic lower back pain.  His pain is localized in the lower back and is nonradiating and not associated with weakness.  It is worsening with bending, prolonged standing, extending his back and relieved when he lay back in bed.  He has no history of any back surgery nor any steroid injection.  He has tried physical therapy in the past with minimal improvement.  His physical exam finding and from history taking his symptoms are most likely due to sacroiliitis that has been flared and we we will schedule him for bilateral SI steroid injection.  Also we advised the patient to stop cyclobenzaprine as he did not help his pain and we will start him on tizanidine instead, we explained to the patient that he might feel dizzy or sleepy and to take it cautiously as needed.  Plan for today:  - We will schedule patient for bilateral  sacroiliac joint injection under fluoroscopic guidance.  Discussed risk benefits and alternatives and patient would like to proceed  - Will start tizanidine 2 mg 3 times daily for muscle spasm and pain.  - Patient will follow-up with my nurse practitioner after the SI joint ejection.  If he still having more axial pain or radicular symptoms may obtain MRI at that point       This note was generated with the aid of dictation software, there may be typos despite my attempts at proofreading.     I saw and evaluated the patient. I personally obtained the key and critical portions of the history and physical exam or was physically present for key and critical portions performed by the resident/fellow. I reviewed the resident/fellow's documentation and discussed the patient with the resident/fellow. I agree with the resident/fellow's medical decision making as documented in the note.    Luis Manuel Palma MD

## 2024-10-11 ENCOUNTER — APPOINTMENT (OUTPATIENT)
Dept: PODIATRY | Facility: CLINIC | Age: 64
End: 2024-10-11
Payer: COMMERCIAL

## 2024-10-11 DIAGNOSIS — Z79.4 TYPE 2 DIABETES MELLITUS WITHOUT COMPLICATION, WITH LONG-TERM CURRENT USE OF INSULIN (MULTI): ICD-10-CM

## 2024-10-11 DIAGNOSIS — E11.9 TYPE 2 DIABETES MELLITUS WITHOUT COMPLICATION, WITH LONG-TERM CURRENT USE OF INSULIN (MULTI): ICD-10-CM

## 2024-10-11 DIAGNOSIS — M79.675 TOE PAIN, LEFT: ICD-10-CM

## 2024-10-11 DIAGNOSIS — M79.674 TOE PAIN, RIGHT: ICD-10-CM

## 2024-10-11 DIAGNOSIS — L60.3 NAIL DYSTROPHY: ICD-10-CM

## 2024-10-11 DIAGNOSIS — B35.1 TINEA UNGUIUM: Primary | ICD-10-CM

## 2024-10-11 PROCEDURE — 3048F LDL-C <100 MG/DL: CPT | Performed by: PODIATRIST

## 2024-10-11 PROCEDURE — 3044F HG A1C LEVEL LT 7.0%: CPT | Performed by: PODIATRIST

## 2024-10-11 PROCEDURE — 11721 DEBRIDE NAIL 6 OR MORE: CPT | Performed by: PODIATRIST

## 2024-10-11 PROCEDURE — 1159F MED LIST DOCD IN RCRD: CPT | Performed by: PODIATRIST

## 2024-10-11 PROCEDURE — 99202 OFFICE O/P NEW SF 15 MIN: CPT | Performed by: PODIATRIST

## 2024-10-11 PROCEDURE — 1160F RVW MEDS BY RX/DR IN RCRD: CPT | Performed by: PODIATRIST

## 2024-10-11 PROCEDURE — 1036F TOBACCO NON-USER: CPT | Performed by: PODIATRIST

## 2024-10-11 PROCEDURE — 3062F POS MACROALBUMINURIA REV: CPT | Performed by: PODIATRIST

## 2024-10-11 RX ORDER — CICLOPIROX 80 MG/ML
SOLUTION TOPICAL NIGHTLY
Qty: 6.6 ML | Refills: 3 | Status: SHIPPED | OUTPATIENT
Start: 2024-10-11

## 2024-10-11 NOTE — PROGRESS NOTES
"CC:  painful thickened and elongated toenails and diabetic care.     HPI: Pt presents as new pt for dm foot exam and painful thickened and elongated toenails that are difficult to manage.  Onset was gradual with worsening course until recently.  Aggravated by shoe gear and ambulation.       PCP: Dr. Gill  Last visit: 8-27-24     PMH  Past Medical History:   Diagnosis Date    Alcohol abuse, uncomplicated     Alcohol abuse    Other specified health status     No pertinent past surgical history     MEDS    Current Outpatient Medications:     allopurinol (Zyloprim) 300 mg tablet, Take 1 tablet (300 mg) by mouth once daily., Disp: 90 tablet, Rfl: 0    ARIPiprazole (Abilify) 5 mg tablet, Take 1 tablet (5 mg) by mouth once daily., Disp: 90 tablet, Rfl: 0    aspirin 81 mg EC tablet, Take 1 tablet (81 mg) by mouth., Disp: , Rfl:     atorvastatin (Lipitor) 40 mg tablet, Take 1 tablet (40 mg) by mouth once daily., Disp: 90 tablet, Rfl: 0    BD Ultra-Fine Mini Pen Needle 31 gauge x 3/16\" needle, use as directed daily with insulin, Disp: 100 each, Rfl: 11    blood sugar diagnostic (Blood Glucose Test) strip, 4 times a day., Disp: , Rfl:     blood sugar diagnostic (Blood Glucose Test) strip, 1 strip 4 times a day., Disp: 125 strip, Rfl: 11    blood-glucose meter misc, Use as directed, Disp: 1 each, Rfl: 0    ciclopirox (Penlac) 8 % solution, Apply topically once daily at bedtime., Disp: 6.6 mL, Rfl: 3    cyclobenzaprine (Flexeril) 10 mg tablet, Take 1 tablet (10 mg) by mouth 3 times a day as needed for muscle spasms., Disp: 90 tablet, Rfl: 2    escitalopram (Lexapro) 20 mg tablet, Take 1 tablet (20 mg) by mouth once daily., Disp: 90 tablet, Rfl: 0    exenatide microspheres (Bydureon BCise) 2 mg/0.85 mL auto-injector, Inject 1 Syringe under the skin 1 (one) time per week., Disp: 10.2 mL, Rfl: 0    FreeStyle glucose monitoring kit, As directed, Disp: , Rfl:     hydroCHLOROthiazide (HYDRODiuril) 25 mg tablet, Take 1 tablet (25 " mg) by mouth once daily., Disp: 90 tablet, Rfl: 0    lancets misc, 2 times a day., Disp: , Rfl:     lancets misc, Test four times daily, Disp: 125 each, Rfl: 11    Lantus Solostar U-100 Insulin 100 unit/mL (3 mL) pen, Inject 150 units under the skin once daily at bedtime, Disp: 45 mL, Rfl: 0    metFORMIN (Glucophage) 1,000 mg tablet, Take 1 tablet (1,000 mg) by mouth 2 times a day., Disp: 180 tablet, Rfl: 0    metoprolol tartrate (Lopressor) 50 mg tablet, Take 1 tablet by mouth 2 times a day., Disp: 180 tablet, Rfl: 0    mirtazapine (Remeron) 30 mg tablet, Take 1 tablet (30 mg) by mouth once daily at bedtime., Disp: 90 tablet, Rfl: 0    OneTouch Delica Plus Lancet 33 gauge misc, 1 each 2 times a day., Disp: , Rfl:     pantoprazole (ProtoNix) 40 mg EC tablet, Take 1 tablet (40 mg) by mouth once daily., Disp: 90 tablet, Rfl: 0    pen needle, diabetic (SURE COMFORT PEN NEEDLE MISC), 31g x 5mm use as directed daily with insulin, Disp: , Rfl:     SITagliptin phosphate (Januvia) 100 mg tablet, Take 1 tablet (100 mg) by mouth once daily., Disp: 90 tablet, Rfl: 0    tamsulosin (Flomax) 0.4 mg 24 hr capsule, Take 1 capsule (0.4 mg) by mouth once daily., Disp: 90 capsule, Rfl: 0    tiZANidine (Zanaflex) 2 mg tablet, Take 1 tablet (2 mg) by mouth every 8 hours if needed for muscle spasms., Disp: 90 tablet, Rfl: 2  Allergies  No Known Allergies  Social History     Socioeconomic History    Marital status: Single   Tobacco Use    Smoking status: Never    Smokeless tobacco: Never   Substance and Sexual Activity    Alcohol use: Not Currently    Drug use: Not Currently     Family History   Problem Relation Name Age of Onset    Other (aortic valve disorder) Father       Past Surgical History:   Procedure Laterality Date    OTHER SURGICAL HISTORY  02/23/2022    Lipoma excision       REVIEW OF SYSTEMS  DERM:   + as noted in HPI.       Physical examination:   On General Observation: Patient is a pleasant, cooperative, well developed 64  y.o. diabetic   adult male. The patient is alert and oriented to time, place and person. Patient has normal affect and mood.  There were no vitals taken for this visit.    Vascular:  DP and PT pulses are 2/4 b/l.  mild edema noted. mild varicosities b/l.  CFT  6 seconds to all digits bilateral.  Skin temperature is warm to warm from proximal to distal bilateral.      Muscular: Strength is 5/5 for all instrinsic and extrinsic muscle groups.     Neuro:  Proprioception present.   Sensation to vibration is  present. Protective sensation present  at all pedal sites via [place] Helen 5.07 monofilament bilateral.  Light touch intact bilateral.     Derm:    Left toenails: 1-5 Brittleness, crumbling upon debridement, subungual debris, elongation, mycotic appearance, tenderness, and thickness.   Right toenails: 1-5 Brittleness, crumbling upon debridement, subungual debris, elongation, mycotic appearance, tenderness, and thickness.   Hair growth is decreased b/l le    Ortho:  Contraction of the lesser toes are present    ASSESSMENT:    Tinea Unguium [B35.1]   DM  Pain in right toe(s) [M79.674]   Pain in left toe(s) [M79.675]       PLAN:   Consult  A comprehensive history and physical examination were preformed. DM foot care and DM foot manifestations were reviewed.  The patient was educated on clinical findings, diagnosis and treatment plans. Patient understands all that has been explained and all questions were answered to apparent satisfaction.   -reviewed options for nail fungus and treatment options, will start with topical penlac, reviewed time frame.  - Debrided toenails 1-10 in length and height.   - Follow up in 9-12 weeks.       Tho Wei DPM

## 2024-10-19 DIAGNOSIS — Z79.4 TYPE 2 DIABETES MELLITUS WITHOUT COMPLICATION, WITH LONG-TERM CURRENT USE OF INSULIN (MULTI): ICD-10-CM

## 2024-10-19 DIAGNOSIS — E11.9 TYPE 2 DIABETES MELLITUS WITHOUT COMPLICATION, WITH LONG-TERM CURRENT USE OF INSULIN (MULTI): ICD-10-CM

## 2024-10-21 RX ORDER — INSULIN GLARGINE 100 [IU]/ML
150 INJECTION, SOLUTION SUBCUTANEOUS NIGHTLY
Qty: 45 ML | Refills: 0 | Status: SHIPPED | OUTPATIENT
Start: 2024-10-21 | End: 2024-11-20

## 2024-10-24 ENCOUNTER — TELEPHONE (OUTPATIENT)
Dept: PAIN MEDICINE | Facility: HOSPITAL | Age: 64
End: 2024-10-24
Payer: COMMERCIAL

## 2024-10-24 DIAGNOSIS — E11.9 TYPE 2 DIABETES MELLITUS WITHOUT COMPLICATION, WITH LONG-TERM CURRENT USE OF INSULIN (MULTI): ICD-10-CM

## 2024-10-24 DIAGNOSIS — Z79.4 TYPE 2 DIABETES MELLITUS WITHOUT COMPLICATION, WITH LONG-TERM CURRENT USE OF INSULIN (MULTI): ICD-10-CM

## 2024-10-24 RX ORDER — METFORMIN HYDROCHLORIDE 1000 MG/1
1000 TABLET ORAL 2 TIMES DAILY
Qty: 60 TABLET | Refills: 0 | Status: SHIPPED | OUTPATIENT
Start: 2024-10-24 | End: 2024-11-23

## 2024-10-24 NOTE — TELEPHONE ENCOUNTER
Called pt and relayed to him what Dr. Palma responded and pt would like increased dose of tizanidine.  Do you want him on tizanidine 4 mg 1 tablet po TID?  Thanks!

## 2024-10-24 NOTE — TELEPHONE ENCOUNTER
Patient called and advised to cancel injection and FU appointments.. patient said, back pain for years and no one wants to help him.

## 2024-10-24 NOTE — TELEPHONE ENCOUNTER
Spoke with patient, he has an appt 11/4, he is wondering if you can send in enough metformin until then.   Thanks!    LEN VIDES MA

## 2024-10-24 NOTE — TELEPHONE ENCOUNTER
Patient called to advise tiZANidine (Zanaflex) 2 mg tablet is not touching the pain..  patient advise Hydrocodone-acetaminophen help a lot with the pain.

## 2024-11-01 DIAGNOSIS — M54.50 CHRONIC LOW BACK PAIN, UNSPECIFIED BACK PAIN LATERALITY, UNSPECIFIED WHETHER SCIATICA PRESENT: ICD-10-CM

## 2024-11-01 DIAGNOSIS — G89.29 CHRONIC LOW BACK PAIN, UNSPECIFIED BACK PAIN LATERALITY, UNSPECIFIED WHETHER SCIATICA PRESENT: ICD-10-CM

## 2024-11-01 RX ORDER — CYCLOBENZAPRINE HCL 10 MG
10 TABLET ORAL 3 TIMES DAILY PRN
Qty: 90 TABLET | Refills: 0 | OUTPATIENT
Start: 2024-11-01

## 2024-11-04 ENCOUNTER — APPOINTMENT (OUTPATIENT)
Dept: PRIMARY CARE | Facility: CLINIC | Age: 64
End: 2024-11-04
Payer: COMMERCIAL

## 2024-11-04 VITALS
TEMPERATURE: 98.2 F | OXYGEN SATURATION: 96 % | HEART RATE: 77 BPM | DIASTOLIC BLOOD PRESSURE: 62 MMHG | HEIGHT: 71 IN | BODY MASS INDEX: 39.53 KG/M2 | WEIGHT: 282.4 LBS | SYSTOLIC BLOOD PRESSURE: 104 MMHG

## 2024-11-04 DIAGNOSIS — M54.50 CHRONIC LOW BACK PAIN, UNSPECIFIED BACK PAIN LATERALITY, UNSPECIFIED WHETHER SCIATICA PRESENT: Primary | ICD-10-CM

## 2024-11-04 DIAGNOSIS — K21.00 GASTROESOPHAGEAL REFLUX DISEASE WITH ESOPHAGITIS WITHOUT HEMORRHAGE: ICD-10-CM

## 2024-11-04 DIAGNOSIS — M10.9 GOUT, UNSPECIFIED CAUSE, UNSPECIFIED CHRONICITY, UNSPECIFIED SITE: ICD-10-CM

## 2024-11-04 DIAGNOSIS — E78.5 HYPERLIPIDEMIA, UNSPECIFIED HYPERLIPIDEMIA TYPE: ICD-10-CM

## 2024-11-04 DIAGNOSIS — N40.0 PROSTATE ENLARGEMENT: ICD-10-CM

## 2024-11-04 DIAGNOSIS — G47.00 INSOMNIA, UNSPECIFIED TYPE: ICD-10-CM

## 2024-11-04 DIAGNOSIS — F32.A DEPRESSION, UNSPECIFIED DEPRESSION TYPE: ICD-10-CM

## 2024-11-04 DIAGNOSIS — Z79.4 TYPE 2 DIABETES MELLITUS WITHOUT COMPLICATION, WITH LONG-TERM CURRENT USE OF INSULIN (MULTI): ICD-10-CM

## 2024-11-04 DIAGNOSIS — I10 HYPERTENSION, UNSPECIFIED TYPE: ICD-10-CM

## 2024-11-04 DIAGNOSIS — E11.9 TYPE 2 DIABETES MELLITUS WITHOUT COMPLICATION, WITH LONG-TERM CURRENT USE OF INSULIN (MULTI): ICD-10-CM

## 2024-11-04 DIAGNOSIS — G89.29 CHRONIC LOW BACK PAIN, UNSPECIFIED BACK PAIN LATERALITY, UNSPECIFIED WHETHER SCIATICA PRESENT: Primary | ICD-10-CM

## 2024-11-04 LAB — POC HEMOGLOBIN A1C: 6.1 % (ref 4.2–6.5)

## 2024-11-04 PROCEDURE — 3048F LDL-C <100 MG/DL: CPT | Performed by: FAMILY MEDICINE

## 2024-11-04 PROCEDURE — 83036 HEMOGLOBIN GLYCOSYLATED A1C: CPT | Performed by: FAMILY MEDICINE

## 2024-11-04 PROCEDURE — 3062F POS MACROALBUMINURIA REV: CPT | Performed by: FAMILY MEDICINE

## 2024-11-04 PROCEDURE — 3074F SYST BP LT 130 MM HG: CPT | Performed by: FAMILY MEDICINE

## 2024-11-04 PROCEDURE — 3078F DIAST BP <80 MM HG: CPT | Performed by: FAMILY MEDICINE

## 2024-11-04 PROCEDURE — 1159F MED LIST DOCD IN RCRD: CPT | Performed by: FAMILY MEDICINE

## 2024-11-04 PROCEDURE — 3044F HG A1C LEVEL LT 7.0%: CPT | Performed by: FAMILY MEDICINE

## 2024-11-04 PROCEDURE — 99214 OFFICE O/P EST MOD 30 MIN: CPT | Performed by: FAMILY MEDICINE

## 2024-11-04 PROCEDURE — 3008F BODY MASS INDEX DOCD: CPT | Performed by: FAMILY MEDICINE

## 2024-11-04 RX ORDER — ALLOPURINOL 300 MG/1
300 TABLET ORAL DAILY
Qty: 90 TABLET | Refills: 0 | Status: SHIPPED | OUTPATIENT
Start: 2024-11-04 | End: 2025-02-02

## 2024-11-04 RX ORDER — HYDROCHLOROTHIAZIDE 25 MG/1
25 TABLET ORAL DAILY
Qty: 90 TABLET | Refills: 0 | Status: SHIPPED | OUTPATIENT
Start: 2024-11-04

## 2024-11-04 RX ORDER — TAMSULOSIN HYDROCHLORIDE 0.4 MG/1
0.4 CAPSULE ORAL DAILY
Qty: 90 CAPSULE | Refills: 0 | Status: SHIPPED | OUTPATIENT
Start: 2024-11-04 | End: 2025-02-02

## 2024-11-04 RX ORDER — ESCITALOPRAM OXALATE 20 MG/1
20 TABLET ORAL DAILY
Qty: 90 TABLET | Refills: 0 | Status: SHIPPED | OUTPATIENT
Start: 2024-11-04 | End: 2025-02-02

## 2024-11-04 RX ORDER — INSULIN GLARGINE 100 [IU]/ML
150 INJECTION, SOLUTION SUBCUTANEOUS NIGHTLY
Qty: 45 ML | Refills: 2 | Status: SHIPPED | OUTPATIENT
Start: 2024-11-04 | End: 2025-02-02

## 2024-11-04 RX ORDER — METOPROLOL TARTRATE 50 MG/1
50 TABLET ORAL 2 TIMES DAILY
Qty: 180 TABLET | Refills: 0 | Status: SHIPPED | OUTPATIENT
Start: 2024-11-04

## 2024-11-04 RX ORDER — METFORMIN HYDROCHLORIDE 1000 MG/1
1000 TABLET ORAL 2 TIMES DAILY
Qty: 180 TABLET | Refills: 0 | Status: SHIPPED | OUTPATIENT
Start: 2024-11-04 | End: 2025-02-02

## 2024-11-04 RX ORDER — ATORVASTATIN CALCIUM 40 MG/1
40 TABLET, FILM COATED ORAL DAILY
Qty: 90 TABLET | Refills: 0 | Status: SHIPPED | OUTPATIENT
Start: 2024-11-04 | End: 2025-02-02

## 2024-11-04 RX ORDER — CYCLOBENZAPRINE HCL 10 MG
10 TABLET ORAL 3 TIMES DAILY PRN
Qty: 90 TABLET | Refills: 2 | Status: CANCELLED | OUTPATIENT
Start: 2024-11-04 | End: 2025-02-02

## 2024-11-04 RX ORDER — PANTOPRAZOLE SODIUM 40 MG/1
40 TABLET, DELAYED RELEASE ORAL DAILY
Qty: 90 TABLET | Refills: 0 | Status: SHIPPED | OUTPATIENT
Start: 2024-11-04 | End: 2025-02-02

## 2024-11-04 RX ORDER — ARIPIPRAZOLE 5 MG/1
5 TABLET ORAL DAILY
Qty: 90 TABLET | Refills: 0 | Status: SHIPPED | OUTPATIENT
Start: 2024-11-04 | End: 2025-02-02

## 2024-11-04 RX ORDER — MIRTAZAPINE 30 MG/1
30 TABLET, FILM COATED ORAL NIGHTLY
Qty: 90 TABLET | Refills: 0 | Status: SHIPPED | OUTPATIENT
Start: 2024-11-04 | End: 2025-02-02

## 2024-11-04 RX ORDER — EXENATIDE 2 MG/.85ML
1 INJECTION, SUSPENSION, EXTENDED RELEASE SUBCUTANEOUS
Qty: 10.2 ML | Refills: 0 | Status: SHIPPED | OUTPATIENT
Start: 2024-11-10 | End: 2025-02-08

## 2024-11-04 RX ORDER — METHOCARBAMOL 500 MG/1
500 TABLET, FILM COATED ORAL 4 TIMES DAILY
Qty: 120 TABLET | Refills: 2 | Status: SHIPPED | OUTPATIENT
Start: 2024-11-04 | End: 2025-02-02

## 2024-11-04 ASSESSMENT — PATIENT HEALTH QUESTIONNAIRE - PHQ9
SUM OF ALL RESPONSES TO PHQ9 QUESTIONS 1 AND 2: 0
2. FEELING DOWN, DEPRESSED OR HOPELESS: NOT AT ALL
1. LITTLE INTEREST OR PLEASURE IN DOING THINGS: NOT AT ALL

## 2024-11-04 NOTE — PROGRESS NOTES
"Subjective   Patient ID: Domingo Conrad is a 64 y.o. male who presents for Med Refill.    HPI     Pt is here for refills of medications to treat the following medical conditions. Unless otherwise stated, these conditions are well-controlled, pt is taking medications as Rx, and there are no reported side effects to medications or other treatment: chronic low back pain, depression, GERD, gout, hyperlipidemia, HTN, insomnia, prostate enlargement, DM2.     Pt states that he went to pain management in [place]. He was Rx Tizanidine 2 mg TID and told them that it did not work. States that he called and requested Vicodin, but they told him that they did not prescribe controlled substances and they offered injections and a stronger muscle relaxer, but he declined. He previously did PT, [facility] (which he \"failed,\" which is why he is on SS/disability). He has been taking Flexeril \"for a long time\" 10 mg TID. He is requesting a different muscle relaxer to use.     Review of Systems   All other systems reviewed and are negative.      Objective   /62 (BP Location: Left arm, Patient Position: Sitting)   Pulse 77   Temp 36.8 °C (98.2 °F) (Oral)   Ht 1.803 m (5' 11\")   Wt 128 kg (282 lb 6.4 oz)   SpO2 96%   BMI 39.39 kg/m²     Physical Exam  Vitals reviewed.   Constitutional:       General: He is awake.      Appearance: Normal appearance. He is well-developed.   HENT:      Head: Normocephalic and atraumatic.   Cardiovascular:      Rate and Rhythm: Normal rate.   Pulmonary:      Effort: Pulmonary effort is normal.   Musculoskeletal:      Cervical back: Full passive range of motion without pain.      Right lower leg: No edema.      Left lower leg: No edema.   Skin:     General: Skin is warm and dry.      Findings: No lesion or rash.   Neurological:      General: No focal deficit present.      Mental Status: He is alert and oriented to person, place, and time.      Cranial Nerves: No facial asymmetry.      Motor: Motor function " is intact.      Gait: Gait is intact.   Psychiatric:         Attention and Perception: Attention normal.         Mood and Affect: Mood and affect normal.         Assessment/Plan   Problem List Items Addressed This Visit             ICD-10-CM    Type 2 diabetes mellitus E11.9    Relevant Orders    POCT glycosylated hemoglobin (Hb A1C) manually resulted    Comprehensive metabolic panel    Chronic low back pain M54.50, G89.29    Hyperlipidemia E78.5    Hypertension I10    Insomnia G47.00    Prostate enlargement N40.0     Other Visit Diagnoses         Codes    Depression, unspecified depression type     F32.A    Gastroesophageal reflux disease with esophagitis without hemorrhage     K21.00    Gout, unspecified cause, unspecified chronicity, unspecified site     M10.9          Discontinue Tizanidine and Flexeril, will try Robaxin.   He was advised to call the office if no improvement and schedule a follow up appt to discuss this further  A1c is controlled at 6.1%  Continue with other chronic medications without changes

## 2024-11-15 ENCOUNTER — TELEPHONE (OUTPATIENT)
Dept: PRIMARY CARE | Facility: CLINIC | Age: 64
End: 2024-11-15
Payer: COMMERCIAL

## 2024-11-15 NOTE — TELEPHONE ENCOUNTER
Spoke with [facility] pharmacy, they stated that they did receive the Rx of Metformin from 11/4/2024 but patient isn't able to  the Rx until 11/17/2024.   Left message informing patient.

## 2024-12-02 ENCOUNTER — APPOINTMENT (OUTPATIENT)
Dept: PRIMARY CARE | Facility: CLINIC | Age: 64
End: 2024-12-02
Payer: COMMERCIAL

## 2024-12-02 ENCOUNTER — APPOINTMENT (OUTPATIENT)
Dept: PAIN MEDICINE | Facility: HOSPITAL | Age: 64
End: 2024-12-02
Payer: COMMERCIAL

## 2024-12-02 VITALS
TEMPERATURE: 98.3 F | SYSTOLIC BLOOD PRESSURE: 118 MMHG | WEIGHT: 283 LBS | OXYGEN SATURATION: 97 % | HEIGHT: 71 IN | DIASTOLIC BLOOD PRESSURE: 66 MMHG | HEART RATE: 81 BPM | BODY MASS INDEX: 39.62 KG/M2

## 2024-12-02 DIAGNOSIS — A63.0 GENITAL WARTS: Primary | ICD-10-CM

## 2024-12-02 PROCEDURE — 1159F MED LIST DOCD IN RCRD: CPT | Performed by: FAMILY MEDICINE

## 2024-12-02 PROCEDURE — 3078F DIAST BP <80 MM HG: CPT | Performed by: FAMILY MEDICINE

## 2024-12-02 PROCEDURE — 3048F LDL-C <100 MG/DL: CPT | Performed by: FAMILY MEDICINE

## 2024-12-02 PROCEDURE — 1123F ACP DISCUSS/DSCN MKR DOCD: CPT | Performed by: FAMILY MEDICINE

## 2024-12-02 PROCEDURE — 3044F HG A1C LEVEL LT 7.0%: CPT | Performed by: FAMILY MEDICINE

## 2024-12-02 PROCEDURE — 1158F ADVNC CARE PLAN TLK DOCD: CPT | Performed by: FAMILY MEDICINE

## 2024-12-02 PROCEDURE — 1036F TOBACCO NON-USER: CPT | Performed by: FAMILY MEDICINE

## 2024-12-02 PROCEDURE — 3008F BODY MASS INDEX DOCD: CPT | Performed by: FAMILY MEDICINE

## 2024-12-02 PROCEDURE — 3074F SYST BP LT 130 MM HG: CPT | Performed by: FAMILY MEDICINE

## 2024-12-02 PROCEDURE — 3062F POS MACROALBUMINURIA REV: CPT | Performed by: FAMILY MEDICINE

## 2024-12-02 PROCEDURE — 99214 OFFICE O/P EST MOD 30 MIN: CPT | Performed by: FAMILY MEDICINE

## 2024-12-02 RX ORDER — IMIQUIMOD 12.5 MG/.25G
CREAM TOPICAL
Qty: 12 PACKET | Refills: 1 | Status: SHIPPED | OUTPATIENT
Start: 2024-12-02

## 2024-12-02 NOTE — PROGRESS NOTES
"Subjective   Patient ID: Domingo Conrad is a 64 y.o. male who presents for Mass.    HPI     Patient is here for a growth he noticed on his penis. He thinks it may be a wart, and he noticed it about 2 weeks. Patient states it is painful and itchy.     Review of Systems   All other systems reviewed and are negative.      Objective   /66 (BP Location: Left arm, Patient Position: Sitting)   Pulse 81   Temp 36.8 °C (98.3 °F) (Oral)   Ht 1.803 m (5' 11\")   Wt 128 kg (283 lb)   SpO2 97%   BMI 39.47 kg/m²     Physical Exam  Constitutional:       Appearance: Normal appearance.   Eyes:      Conjunctiva/sclera: Conjunctivae normal.   Cardiovascular:      Rate and Rhythm: Normal rate and regular rhythm.   Pulmonary:      Effort: Pulmonary effort is normal.      Breath sounds: Normal breath sounds.   Abdominal:      Palpations: Abdomen is soft.   Genitourinary:     Comments: Pt has a broad based lesion on the left lower scrotum. It appears verrucous in nature with a mild amount of pigmentation as well. No ulceration. Appears consistent with genital wart.  Musculoskeletal:         General: Normal range of motion.   Skin:     General: Skin is warm and dry.   Neurological:      Mental Status: He is alert.   Psychiatric:         Mood and Affect: Mood normal.         Assessment/Plan   Diagnoses and all orders for this visit:  Genital warts  -     imiquimod (Aldara) 5 % cream; ATAA on Mon, Wed and Fri nights x 6 weeks.     RTC if lesion is not entirely gone after 12 weeks Tx  "

## 2024-12-05 ENCOUNTER — OFFICE VISIT (OUTPATIENT)
Dept: PODIATRY | Facility: CLINIC | Age: 64
End: 2024-12-05
Payer: COMMERCIAL

## 2024-12-05 DIAGNOSIS — L03.032 CELLULITIS OF LEFT TOE: Primary | ICD-10-CM

## 2024-12-05 DIAGNOSIS — L97.521 ULCER OF LEFT SECOND TOE, LIMITED TO BREAKDOWN OF SKIN: ICD-10-CM

## 2024-12-05 DIAGNOSIS — E11.9 DIABETES MELLITUS WITHOUT COMPLICATION (MULTI): ICD-10-CM

## 2024-12-05 PROCEDURE — 99213 OFFICE O/P EST LOW 20 MIN: CPT | Performed by: PODIATRIST

## 2024-12-05 PROCEDURE — 3048F LDL-C <100 MG/DL: CPT | Performed by: PODIATRIST

## 2024-12-05 PROCEDURE — 1160F RVW MEDS BY RX/DR IN RCRD: CPT | Performed by: PODIATRIST

## 2024-12-05 PROCEDURE — 1036F TOBACCO NON-USER: CPT | Performed by: PODIATRIST

## 2024-12-05 PROCEDURE — 1159F MED LIST DOCD IN RCRD: CPT | Performed by: PODIATRIST

## 2024-12-05 PROCEDURE — 3044F HG A1C LEVEL LT 7.0%: CPT | Performed by: PODIATRIST

## 2024-12-05 PROCEDURE — 3062F POS MACROALBUMINURIA REV: CPT | Performed by: PODIATRIST

## 2024-12-05 NOTE — PROGRESS NOTES
"CC: left toe infection    HPI:  Patient seen for left 2nd toe infection for several weeks, denies any trauma or n/v/f/c. Is not sure how it developed, had callus there in the past, he was pouring ETHO and peroxide on the toe, he is DM as well.    PCP: Dr. Gill  Last visit: 11-4-24     PMH  Past Medical History:   Diagnosis Date    Alcohol abuse, uncomplicated     Alcohol abuse    Other specified health status     No pertinent past surgical history     MEDS    Current Outpatient Medications:     allopurinol (Zyloprim) 300 mg tablet, Take 1 tablet (300 mg) by mouth once daily., Disp: 90 tablet, Rfl: 0    ARIPiprazole (Abilify) 5 mg tablet, Take 1 tablet (5 mg) by mouth once daily., Disp: 90 tablet, Rfl: 0    aspirin 81 mg EC tablet, Take 1 tablet (81 mg) by mouth., Disp: , Rfl:     atorvastatin (Lipitor) 40 mg tablet, Take 1 tablet (40 mg) by mouth once daily., Disp: 90 tablet, Rfl: 0    BD Ultra-Fine Mini Pen Needle 31 gauge x 3/16\" needle, use as directed daily with insulin, Disp: 100 each, Rfl: 11    blood sugar diagnostic (Blood Glucose Test) strip, 4 times a day., Disp: , Rfl:     blood sugar diagnostic (Blood Glucose Test) strip, 1 strip 4 times a day., Disp: 125 strip, Rfl: 11    blood-glucose meter misc, Use as directed, Disp: 1 each, Rfl: 0    ciclopirox (Penlac) 8 % solution, Apply topically once daily at bedtime., Disp: 6.6 mL, Rfl: 3    escitalopram (Lexapro) 20 mg tablet, Take 1 tablet (20 mg) by mouth once daily., Disp: 90 tablet, Rfl: 0    exenatide microspheres (Bydureon BCise) 2 mg/0.85 mL auto-injector, Inject 1 Syringe under the skin 1 (one) time per week., Disp: 10.2 mL, Rfl: 0    FreeStyle glucose monitoring kit, As directed, Disp: , Rfl:     hydroCHLOROthiazide (HYDRODiuril) 25 mg tablet, Take 1 tablet (25 mg) by mouth once daily., Disp: 90 tablet, Rfl: 0    imiquimod (Aldara) 5 % cream, ATAA on Mon, Wed and Fri nights x 6 weeks., Disp: 12 packet, Rfl: 1    insulin glargine (Lantus Solostar " U-100 Insulin) 100 unit/mL (3 mL) pen, Inject 150 Units under the skin once daily at bedtime., Disp: 45 mL, Rfl: 2    lancets misc, 2 times a day., Disp: , Rfl:     lancets misc, Test four times daily, Disp: 125 each, Rfl: 11    metFORMIN (Glucophage) 1,000 mg tablet, Take 1 tablet (1,000 mg) by mouth 2 times a day., Disp: 180 tablet, Rfl: 0    methocarbamol (Robaxin) 500 mg tablet, Take 1 tablet (500 mg) by mouth 4 times a day., Disp: 120 tablet, Rfl: 2    metoprolol tartrate (Lopressor) 50 mg tablet, Take 1 tablet by mouth 2 times a day., Disp: 180 tablet, Rfl: 0    mirtazapine (Remeron) 30 mg tablet, Take 1 tablet (30 mg) by mouth once daily at bedtime., Disp: 90 tablet, Rfl: 0    OneTouch Delica Plus Lancet 33 gauge misc, 1 each 2 times a day., Disp: , Rfl:     pantoprazole (ProtoNix) 40 mg EC tablet, Take 1 tablet (40 mg) by mouth once daily., Disp: 90 tablet, Rfl: 0    pen needle, diabetic (SURE COMFORT PEN NEEDLE MISC), 31g x 5mm use as directed daily with insulin, Disp: , Rfl:     SITagliptin phosphate (Januvia) 100 mg tablet, Take 1 tablet (100 mg) by mouth once daily., Disp: 90 tablet, Rfl: 0    tamsulosin (Flomax) 0.4 mg 24 hr capsule, Take 1 capsule (0.4 mg) by mouth once daily., Disp: 90 capsule, Rfl: 0  Allergies  No Known Allergies  Social History     Socioeconomic History    Marital status: Single   Tobacco Use    Smoking status: Never    Smokeless tobacco: Never   Substance and Sexual Activity    Alcohol use: Not Currently    Drug use: Not Currently     Family History   Problem Relation Name Age of Onset    Other (aortic valve disorder) Father       Past Surgical History:   Procedure Laterality Date    OTHER SURGICAL HISTORY  02/23/2022    Lipoma excision       REVIEW OF SYSTEMS    + as noted above in HPI.       Physical examination:   On General Observation: Patient is a pleasant, cooperative, well developed 64 y.o.  adult male. The patient is alert and oriented to time, place and person. Patient  has normal affect and mood.  There were no vitals taken for this visit.    Vascular:  DP and PT pulses are palpable.  Slight noon pitting edema noted. mild varicosities b/l.  CFT  6 seconds to all digits bilateral.  Skin temperature is warm to warm from proximal to distal bilateral.      Muscular: Strength is 5/5 b/l.  Motor strength is normal and symmetric proximally, as well as distally, in all four extremities. Good mobility of all extremities.  Tenderness to left 2nd toe.     Neuro:  Proprioception present.   Sensation to vibration is  present. Protective sensation present  at all pedal sites via [place] Helen 5.07 monofilament bilateral.  Light touch decreased bilateral.     Derm:  ulcer is present left 2nd toe distal tuft, there is no odor or drainage, local erythema is present to the distal phalanx, slight warmth, no streaking.    Ortho:  Rigid contraction of the left 2nd toe is present        ASSESSMENT:    Cellulitis left 2nd toe  Ulcer left 2nd toe  DM     PLAN:   Exam  A comprehensive history and physical examination were preformed. The patient was educated on clinical findings, diagnosis and treatment plans. Patient understands all that has been explained and all questions were answered to apparent satisfaction.   -Reviewed etiology of the above and treatment options, suspicious for possible bone infection, reviewed options for pt, recommend to go to ER for complete workup up a and eval. Xrays,, c/s, possible debridement, mri, antibioitcs/admission, pt will go home, collect his meds and head to ER, [facility].        Tho Wei DPM

## 2025-01-10 ENCOUNTER — APPOINTMENT (OUTPATIENT)
Dept: PODIATRY | Facility: CLINIC | Age: 65
End: 2025-01-10
Payer: COMMERCIAL

## 2025-01-14 DIAGNOSIS — Z79.4 TYPE 2 DIABETES MELLITUS WITHOUT COMPLICATION, WITH LONG-TERM CURRENT USE OF INSULIN (MULTI): ICD-10-CM

## 2025-01-14 DIAGNOSIS — E11.9 TYPE 2 DIABETES MELLITUS WITHOUT COMPLICATION, WITH LONG-TERM CURRENT USE OF INSULIN (MULTI): ICD-10-CM

## 2025-01-14 RX ORDER — METFORMIN HYDROCHLORIDE 1000 MG/1
1000 TABLET ORAL 2 TIMES DAILY
Qty: 180 TABLET | Refills: 0 | OUTPATIENT
Start: 2025-01-14

## 2025-01-22 DIAGNOSIS — Z79.4 TYPE 2 DIABETES MELLITUS WITHOUT COMPLICATION, WITH LONG-TERM CURRENT USE OF INSULIN (MULTI): ICD-10-CM

## 2025-01-22 DIAGNOSIS — E11.9 TYPE 2 DIABETES MELLITUS WITHOUT COMPLICATION, WITH LONG-TERM CURRENT USE OF INSULIN (MULTI): ICD-10-CM

## 2025-01-23 RX ORDER — BLOOD-GLUCOSE METER
EACH MISCELLANEOUS
Qty: 125 EACH | Refills: 0 | Status: SHIPPED | OUTPATIENT
Start: 2025-01-23

## 2025-02-03 ENCOUNTER — APPOINTMENT (OUTPATIENT)
Dept: PRIMARY CARE | Facility: CLINIC | Age: 65
End: 2025-02-03
Payer: COMMERCIAL

## 2025-02-03 VITALS
DIASTOLIC BLOOD PRESSURE: 60 MMHG | SYSTOLIC BLOOD PRESSURE: 104 MMHG | OXYGEN SATURATION: 96 % | TEMPERATURE: 98.3 F | BODY MASS INDEX: 39.45 KG/M2 | WEIGHT: 281.8 LBS | HEIGHT: 71 IN | HEART RATE: 72 BPM

## 2025-02-03 DIAGNOSIS — E11.9 TYPE 2 DIABETES MELLITUS WITHOUT COMPLICATION, WITH LONG-TERM CURRENT USE OF INSULIN (MULTI): ICD-10-CM

## 2025-02-03 DIAGNOSIS — F32.A DEPRESSION, UNSPECIFIED DEPRESSION TYPE: ICD-10-CM

## 2025-02-03 DIAGNOSIS — M54.50 CHRONIC LOW BACK PAIN, UNSPECIFIED BACK PAIN LATERALITY, UNSPECIFIED WHETHER SCIATICA PRESENT: ICD-10-CM

## 2025-02-03 DIAGNOSIS — N40.0 PROSTATE ENLARGEMENT: ICD-10-CM

## 2025-02-03 DIAGNOSIS — G89.29 CHRONIC LOW BACK PAIN, UNSPECIFIED BACK PAIN LATERALITY, UNSPECIFIED WHETHER SCIATICA PRESENT: ICD-10-CM

## 2025-02-03 DIAGNOSIS — Z79.4 TYPE 2 DIABETES MELLITUS WITHOUT COMPLICATION, WITH LONG-TERM CURRENT USE OF INSULIN (MULTI): ICD-10-CM

## 2025-02-03 DIAGNOSIS — K21.00 GASTROESOPHAGEAL REFLUX DISEASE WITH ESOPHAGITIS WITHOUT HEMORRHAGE: ICD-10-CM

## 2025-02-03 DIAGNOSIS — I10 HYPERTENSION, UNSPECIFIED TYPE: ICD-10-CM

## 2025-02-03 DIAGNOSIS — M10.9 GOUT, UNSPECIFIED CAUSE, UNSPECIFIED CHRONICITY, UNSPECIFIED SITE: ICD-10-CM

## 2025-02-03 DIAGNOSIS — E78.5 HYPERLIPIDEMIA, UNSPECIFIED HYPERLIPIDEMIA TYPE: ICD-10-CM

## 2025-02-03 DIAGNOSIS — G47.00 INSOMNIA, UNSPECIFIED TYPE: ICD-10-CM

## 2025-02-03 LAB — POC HEMOGLOBIN A1C: 5.9 % (ref 4.2–6.5)

## 2025-02-03 PROCEDURE — 99215 OFFICE O/P EST HI 40 MIN: CPT | Performed by: FAMILY MEDICINE

## 2025-02-03 PROCEDURE — 1159F MED LIST DOCD IN RCRD: CPT | Performed by: FAMILY MEDICINE

## 2025-02-03 PROCEDURE — 3008F BODY MASS INDEX DOCD: CPT | Performed by: FAMILY MEDICINE

## 2025-02-03 PROCEDURE — 1123F ACP DISCUSS/DSCN MKR DOCD: CPT | Performed by: FAMILY MEDICINE

## 2025-02-03 PROCEDURE — 3078F DIAST BP <80 MM HG: CPT | Performed by: FAMILY MEDICINE

## 2025-02-03 PROCEDURE — 83036 HEMOGLOBIN GLYCOSYLATED A1C: CPT | Performed by: FAMILY MEDICINE

## 2025-02-03 PROCEDURE — 3074F SYST BP LT 130 MM HG: CPT | Performed by: FAMILY MEDICINE

## 2025-02-03 PROCEDURE — 1036F TOBACCO NON-USER: CPT | Performed by: FAMILY MEDICINE

## 2025-02-03 PROCEDURE — 1158F ADVNC CARE PLAN TLK DOCD: CPT | Performed by: FAMILY MEDICINE

## 2025-02-03 RX ORDER — METHOCARBAMOL 500 MG/1
500 TABLET, FILM COATED ORAL 4 TIMES DAILY
Qty: 120 TABLET | Refills: 2 | Status: SHIPPED | OUTPATIENT
Start: 2025-02-03 | End: 2025-05-04

## 2025-02-03 RX ORDER — PANTOPRAZOLE SODIUM 40 MG/1
40 TABLET, DELAYED RELEASE ORAL DAILY
Qty: 90 TABLET | Refills: 0 | Status: SHIPPED | OUTPATIENT
Start: 2025-02-03 | End: 2025-05-04

## 2025-02-03 RX ORDER — ATORVASTATIN CALCIUM 40 MG/1
40 TABLET, FILM COATED ORAL DAILY
Qty: 90 TABLET | Refills: 0 | Status: SHIPPED | OUTPATIENT
Start: 2025-02-03 | End: 2025-05-04

## 2025-02-03 RX ORDER — ARIPIPRAZOLE 5 MG/1
5 TABLET ORAL DAILY
Qty: 90 TABLET | Refills: 0 | Status: SHIPPED | OUTPATIENT
Start: 2025-02-03 | End: 2025-05-04

## 2025-02-03 RX ORDER — METFORMIN HYDROCHLORIDE 1000 MG/1
1000 TABLET ORAL 2 TIMES DAILY
Qty: 180 TABLET | Refills: 0 | Status: SHIPPED | OUTPATIENT
Start: 2025-02-03 | End: 2025-05-04

## 2025-02-03 RX ORDER — INSULIN GLARGINE 100 [IU]/ML
150 INJECTION, SOLUTION SUBCUTANEOUS NIGHTLY
Qty: 45 ML | Refills: 2 | Status: SHIPPED | OUTPATIENT
Start: 2025-02-03 | End: 2025-05-04

## 2025-02-03 RX ORDER — BLOOD-GLUCOSE METER
EACH MISCELLANEOUS
Qty: 120 EACH | Refills: 11 | Status: SHIPPED | OUTPATIENT
Start: 2025-02-03

## 2025-02-03 RX ORDER — TAMSULOSIN HYDROCHLORIDE 0.4 MG/1
0.4 CAPSULE ORAL DAILY
Qty: 90 CAPSULE | Refills: 0 | Status: SHIPPED | OUTPATIENT
Start: 2025-02-03 | End: 2025-05-04

## 2025-02-03 RX ORDER — METOPROLOL TARTRATE 50 MG/1
50 TABLET ORAL 2 TIMES DAILY
Qty: 180 TABLET | Refills: 0 | Status: SHIPPED | OUTPATIENT
Start: 2025-02-03

## 2025-02-03 RX ORDER — ALLOPURINOL 300 MG/1
300 TABLET ORAL DAILY
Qty: 90 TABLET | Refills: 0 | Status: SHIPPED | OUTPATIENT
Start: 2025-02-03 | End: 2025-05-04

## 2025-02-03 RX ORDER — ESCITALOPRAM OXALATE 20 MG/1
20 TABLET ORAL DAILY
Qty: 90 TABLET | Refills: 0 | Status: SHIPPED | OUTPATIENT
Start: 2025-02-03 | End: 2025-05-04

## 2025-02-03 RX ORDER — HYDROCHLOROTHIAZIDE 25 MG/1
25 TABLET ORAL DAILY
Qty: 90 TABLET | Refills: 0 | Status: SHIPPED | OUTPATIENT
Start: 2025-02-03

## 2025-02-03 RX ORDER — MIRTAZAPINE 30 MG/1
30 TABLET, FILM COATED ORAL NIGHTLY
Qty: 90 TABLET | Refills: 0 | Status: SHIPPED | OUTPATIENT
Start: 2025-02-03 | End: 2025-05-04

## 2025-02-03 ASSESSMENT — PATIENT HEALTH QUESTIONNAIRE - PHQ9
2. FEELING DOWN, DEPRESSED OR HOPELESS: NOT AT ALL
1. LITTLE INTEREST OR PLEASURE IN DOING THINGS: NOT AT ALL
SUM OF ALL RESPONSES TO PHQ9 QUESTIONS 1 AND 2: 0

## 2025-02-03 NOTE — PROGRESS NOTES
"Subjective   Patient ID: Domingo Conrad is a 64 y.o. male who presents for Med Refill.    HPI     Pt is here for refills of medications to treat the following medical conditions. Unless otherwise stated, these conditions are well-controlled, pt is taking medications s Rx, and there are no reported side effects to medications or other treatment: chronic low back pain, depression, GERD,gout, hyperlipidemia, HTN, insomnia, prostate enlargement, DM2    Patient would like albuterol inhaler refilled, he had bronchitis about 1 month ago and would like to continue using it.     Patient also wanted to talk to you about podiatry appointment.     Review of Systems   All other systems reviewed and are negative.      Objective   /60 (BP Location: Left arm, Patient Position: Sitting)   Pulse 72   Temp 36.8 °C (98.3 °F) (Oral)   Ht 1.803 m (5' 11\")   Wt 128 kg (281 lb 12.8 oz)   SpO2 96%   BMI 39.30 kg/m²     Physical Exam  Constitutional:       Appearance: Normal appearance.   Eyes:      Conjunctiva/sclera: Conjunctivae normal.   Cardiovascular:      Rate and Rhythm: Normal rate and regular rhythm.   Pulmonary:      Effort: Pulmonary effort is normal.      Breath sounds: Normal breath sounds.   Abdominal:      Palpations: Abdomen is soft.   Musculoskeletal:         General: Normal range of motion.   Skin:     General: Skin is warm and dry.   Neurological:      Mental Status: He is alert.   Psychiatric:         Mood and Affect: Mood normal.         Assessment/Plan   Diagnoses and all orders for this visit:  Chronic low back pain, unspecified back pain laterality, unspecified whether sciatica present  -     methocarbamol (Robaxin) 500 mg tablet; Take 1 tablet (500 mg) by mouth 4 times a day.  Depression, unspecified depression type  -     ARIPiprazole (Abilify) 5 mg tablet; Take 1 tablet (5 mg) by mouth once daily.  -     escitalopram (Lexapro) 20 mg tablet; Take 1 tablet (20 mg) by mouth once daily.  Gastroesophageal " reflux disease with esophagitis without hemorrhage  -     pantoprazole (ProtoNix) 40 mg EC tablet; Take 1 tablet (40 mg) by mouth once daily.  Gout, unspecified cause, unspecified chronicity, unspecified site  -     allopurinol (Zyloprim) 300 mg tablet; Take 1 tablet (300 mg) by mouth once daily.  Hyperlipidemia, unspecified hyperlipidemia type  -     atorvastatin (Lipitor) 40 mg tablet; Take 1 tablet (40 mg) by mouth once daily.  Hypertension, unspecified type  -     hydroCHLOROthiazide (HYDRODiuril) 25 mg tablet; Take 1 tablet (25 mg) by mouth once daily.  -     metoprolol tartrate (Lopressor) 50 mg tablet; Take 1 tablet by mouth 2 times a day.  Insomnia, unspecified type  -     mirtazapine (Remeron) 30 mg tablet; Take 1 tablet (30 mg) by mouth once daily at bedtime.  Prostate enlargement  -     tamsulosin (Flomax) 0.4 mg 24 hr capsule; Take 1 capsule (0.4 mg) by mouth once daily.  Type 2 diabetes mellitus without complication, with long-term current use of insulin (Multi)  -     POCT glycosylated hemoglobin (Hb A1C) manually resulted  -     insulin glargine (Lantus Solostar U-100 Insulin) 100 unit/mL (3 mL) pen; Inject 150 Units under the skin once daily at bedtime.  -     metFORMIN (Glucophage) 1,000 mg tablet; Take 1 tablet (1,000 mg) by mouth 2 times a day.  -     SITagliptin phosphate (Januvia) 100 mg tablet; Take 1 tablet (100 mg) by mouth once daily.  -     blood sugar diagnostic (OneTouch Verio test strips) strip; Use as directed four times daily to check blood sugar

## 2025-02-04 LAB
ALBUMIN SERPL-MCNC: 4.3 G/DL (ref 3.6–5.1)
ALP SERPL-CCNC: 68 U/L (ref 35–144)
ALT SERPL-CCNC: 21 U/L (ref 9–46)
ANION GAP SERPL CALCULATED.4IONS-SCNC: 10 MMOL/L (CALC) (ref 7–17)
AST SERPL-CCNC: 19 U/L (ref 10–35)
BILIRUB SERPL-MCNC: 0.5 MG/DL (ref 0.2–1.2)
BUN SERPL-MCNC: 17 MG/DL (ref 7–25)
CALCIUM SERPL-MCNC: 9.3 MG/DL (ref 8.6–10.3)
CHLORIDE SERPL-SCNC: 102 MMOL/L (ref 98–110)
CO2 SERPL-SCNC: 29 MMOL/L (ref 20–32)
CREAT SERPL-MCNC: 1.06 MG/DL (ref 0.7–1.35)
EGFRCR SERPLBLD CKD-EPI 2021: 78 ML/MIN/1.73M2
GLUCOSE SERPL-MCNC: 86 MG/DL (ref 65–99)
POTASSIUM SERPL-SCNC: 3.9 MMOL/L (ref 3.5–5.3)
PROT SERPL-MCNC: 6.5 G/DL (ref 6.1–8.1)
SODIUM SERPL-SCNC: 141 MMOL/L (ref 135–146)

## 2025-02-06 DIAGNOSIS — E11.9 TYPE 2 DIABETES MELLITUS WITHOUT COMPLICATION, WITH LONG-TERM CURRENT USE OF INSULIN (MULTI): ICD-10-CM

## 2025-02-06 DIAGNOSIS — Z79.4 TYPE 2 DIABETES MELLITUS WITHOUT COMPLICATION, WITH LONG-TERM CURRENT USE OF INSULIN (MULTI): ICD-10-CM

## 2025-02-12 RX ORDER — PEN NEEDLE, DIABETIC 31 GX3/16"
NEEDLE, DISPOSABLE MISCELLANEOUS
Qty: 100 EACH | Refills: 11 | Status: SHIPPED | OUTPATIENT
Start: 2025-02-12

## 2025-02-12 RX ORDER — LANCETS 33 GAUGE
EACH MISCELLANEOUS
Qty: 125 EACH | Refills: 11 | Status: SHIPPED | OUTPATIENT
Start: 2025-02-12

## 2025-02-13 ENCOUNTER — TELEPHONE (OUTPATIENT)
Dept: PRIMARY CARE | Facility: CLINIC | Age: 65
End: 2025-02-13
Payer: COMMERCIAL

## 2025-02-13 NOTE — TELEPHONE ENCOUNTER
Previous CMP reviewed by Dr. Gill, patient informed to continue with current dose of medication. Will recheck labs at next appointment.

## 2025-03-06 DIAGNOSIS — Z79.4 TYPE 2 DIABETES MELLITUS WITHOUT COMPLICATION, WITH LONG-TERM CURRENT USE OF INSULIN (MULTI): ICD-10-CM

## 2025-03-06 DIAGNOSIS — E11.9 TYPE 2 DIABETES MELLITUS WITHOUT COMPLICATION, WITH LONG-TERM CURRENT USE OF INSULIN (MULTI): ICD-10-CM

## 2025-03-10 RX ORDER — EXENATIDE 2 MG/.85ML
INJECTION, SUSPENSION, EXTENDED RELEASE SUBCUTANEOUS
Qty: 10.2 ML | Refills: 0 | Status: SHIPPED | OUTPATIENT
Start: 2025-03-10

## 2025-05-02 ENCOUNTER — APPOINTMENT (OUTPATIENT)
Dept: PRIMARY CARE | Facility: CLINIC | Age: 65
End: 2025-05-02
Payer: COMMERCIAL

## 2025-05-02 VITALS
HEART RATE: 82 BPM | DIASTOLIC BLOOD PRESSURE: 64 MMHG | WEIGHT: 280 LBS | TEMPERATURE: 98 F | SYSTOLIC BLOOD PRESSURE: 124 MMHG | HEIGHT: 71 IN | OXYGEN SATURATION: 97 % | BODY MASS INDEX: 39.2 KG/M2

## 2025-05-02 DIAGNOSIS — I10 HYPERTENSION, UNSPECIFIED TYPE: ICD-10-CM

## 2025-05-02 DIAGNOSIS — F32.A DEPRESSION, UNSPECIFIED DEPRESSION TYPE: ICD-10-CM

## 2025-05-02 DIAGNOSIS — M10.9 GOUT, UNSPECIFIED CAUSE, UNSPECIFIED CHRONICITY, UNSPECIFIED SITE: ICD-10-CM

## 2025-05-02 DIAGNOSIS — Z79.4 TYPE 2 DIABETES MELLITUS WITHOUT COMPLICATION, WITH LONG-TERM CURRENT USE OF INSULIN: ICD-10-CM

## 2025-05-02 DIAGNOSIS — E11.9 TYPE 2 DIABETES MELLITUS WITHOUT COMPLICATION, WITH LONG-TERM CURRENT USE OF INSULIN: ICD-10-CM

## 2025-05-02 DIAGNOSIS — K21.00 GASTROESOPHAGEAL REFLUX DISEASE WITH ESOPHAGITIS WITHOUT HEMORRHAGE: ICD-10-CM

## 2025-05-02 DIAGNOSIS — M54.50 CHRONIC LOW BACK PAIN, UNSPECIFIED BACK PAIN LATERALITY, UNSPECIFIED WHETHER SCIATICA PRESENT: ICD-10-CM

## 2025-05-02 DIAGNOSIS — G47.00 INSOMNIA, UNSPECIFIED TYPE: ICD-10-CM

## 2025-05-02 DIAGNOSIS — N40.0 PROSTATE ENLARGEMENT: ICD-10-CM

## 2025-05-02 DIAGNOSIS — G89.29 CHRONIC LOW BACK PAIN, UNSPECIFIED BACK PAIN LATERALITY, UNSPECIFIED WHETHER SCIATICA PRESENT: ICD-10-CM

## 2025-05-02 DIAGNOSIS — E78.5 HYPERLIPIDEMIA, UNSPECIFIED HYPERLIPIDEMIA TYPE: ICD-10-CM

## 2025-05-02 LAB — POC HEMOGLOBIN A1C: 5.7 % (ref 4.2–6.5)

## 2025-05-02 RX ORDER — METOPROLOL TARTRATE 50 MG/1
50 TABLET ORAL 2 TIMES DAILY
Qty: 180 TABLET | Refills: 0 | Status: SHIPPED | OUTPATIENT
Start: 2025-05-02

## 2025-05-02 RX ORDER — ARIPIPRAZOLE 5 MG/1
5 TABLET ORAL DAILY
Qty: 90 TABLET | Refills: 0 | Status: SHIPPED | OUTPATIENT
Start: 2025-05-02 | End: 2025-07-31

## 2025-05-02 RX ORDER — METFORMIN HYDROCHLORIDE 1000 MG/1
1000 TABLET ORAL 2 TIMES DAILY
Qty: 180 TABLET | Refills: 0 | Status: SHIPPED | OUTPATIENT
Start: 2025-05-02 | End: 2025-07-31

## 2025-05-02 RX ORDER — METHOCARBAMOL 500 MG/1
500 TABLET, FILM COATED ORAL 4 TIMES DAILY
Qty: 120 TABLET | Refills: 2 | Status: SHIPPED | OUTPATIENT
Start: 2025-05-02 | End: 2025-07-31

## 2025-05-02 RX ORDER — PANTOPRAZOLE SODIUM 40 MG/1
40 TABLET, DELAYED RELEASE ORAL DAILY
Qty: 90 TABLET | Refills: 0 | Status: SHIPPED | OUTPATIENT
Start: 2025-05-02 | End: 2025-07-31

## 2025-05-02 RX ORDER — EXENATIDE 2 MG/.85ML
INJECTION, SUSPENSION, EXTENDED RELEASE SUBCUTANEOUS
Qty: 10.2 ML | Refills: 2 | Status: SHIPPED | OUTPATIENT
Start: 2025-05-02

## 2025-05-02 RX ORDER — MIRTAZAPINE 30 MG/1
30 TABLET, FILM COATED ORAL NIGHTLY
Qty: 90 TABLET | Refills: 0 | Status: SHIPPED | OUTPATIENT
Start: 2025-05-02 | End: 2025-07-31

## 2025-05-02 RX ORDER — HYDROCHLOROTHIAZIDE 25 MG/1
25 TABLET ORAL DAILY
Qty: 90 TABLET | Refills: 0 | Status: SHIPPED | OUTPATIENT
Start: 2025-05-02

## 2025-05-02 RX ORDER — PEN NEEDLE, DIABETIC 30 GX3/16"
NEEDLE, DISPOSABLE MISCELLANEOUS
Qty: 100 EACH | Refills: 11 | Status: SHIPPED | OUTPATIENT
Start: 2025-05-02

## 2025-05-02 RX ORDER — LANCETS 33 GAUGE
EACH MISCELLANEOUS
Qty: 125 EACH | Refills: 11 | Status: SHIPPED | OUTPATIENT
Start: 2025-05-02

## 2025-05-02 RX ORDER — INSULIN GLARGINE 100 [IU]/ML
150 INJECTION, SOLUTION SUBCUTANEOUS NIGHTLY
Qty: 45 ML | Refills: 2 | Status: SHIPPED | OUTPATIENT
Start: 2025-05-02 | End: 2025-07-31

## 2025-05-02 RX ORDER — TAMSULOSIN HYDROCHLORIDE 0.4 MG/1
0.4 CAPSULE ORAL DAILY
Qty: 90 CAPSULE | Refills: 0 | Status: SHIPPED | OUTPATIENT
Start: 2025-05-02 | End: 2025-07-31

## 2025-05-02 RX ORDER — ESCITALOPRAM OXALATE 20 MG/1
20 TABLET ORAL DAILY
Qty: 90 TABLET | Refills: 0 | Status: SHIPPED | OUTPATIENT
Start: 2025-05-02 | End: 2025-07-31

## 2025-05-02 RX ORDER — ALLOPURINOL 300 MG/1
300 TABLET ORAL DAILY
Qty: 90 TABLET | Refills: 0 | Status: SHIPPED | OUTPATIENT
Start: 2025-05-02 | End: 2025-07-31

## 2025-05-02 RX ORDER — ATORVASTATIN CALCIUM 40 MG/1
40 TABLET, FILM COATED ORAL DAILY
Qty: 90 TABLET | Refills: 0 | Status: SHIPPED | OUTPATIENT
Start: 2025-05-02 | End: 2025-07-31

## 2025-05-02 ASSESSMENT — PATIENT HEALTH QUESTIONNAIRE - PHQ9
2. FEELING DOWN, DEPRESSED OR HOPELESS: NOT AT ALL
SUM OF ALL RESPONSES TO PHQ9 QUESTIONS 1 AND 2: 0
1. LITTLE INTEREST OR PLEASURE IN DOING THINGS: NOT AT ALL

## 2025-05-02 NOTE — PROGRESS NOTES
"Subjective   Patient ID: Domingo Conrad is a 64 y.o. male who presents for Med Refill (Is fasting for labs).    HPI Pt is here for refills of medications to treat the following medical conditions. Unless otherwise stated, these conditions are well-controlled, pt is taking medications s Rx, and there are no reported side effects to medications or other treatment: DM2, GOUT, insomnia, depression, chronic depression    Review of Systems   All other systems reviewed and are negative.      Objective   /64   Pulse 82   Temp 36.7 °C (98 °F)   Ht 1.803 m (5' 11\")   Wt 127 kg (280 lb)   SpO2 97%   BMI 39.05 kg/m²     Physical Exam  Constitutional:       Appearance: Normal appearance.   Eyes:      Conjunctiva/sclera: Conjunctivae normal.   Cardiovascular:      Rate and Rhythm: Normal rate and regular rhythm.   Pulmonary:      Effort: Pulmonary effort is normal.      Breath sounds: Normal breath sounds.   Abdominal:      Palpations: Abdomen is soft.   Musculoskeletal:         General: Normal range of motion.   Skin:     General: Skin is warm and dry.   Neurological:      Mental Status: He is alert.   Psychiatric:         Mood and Affect: Mood normal.         Assessment/Plan   Diagnoses and all orders for this visit:  Chronic low back pain, unspecified back pain laterality, unspecified whether sciatica present  -     methocarbamol (Robaxin) 500 mg tablet; Take 1 tablet (500 mg) by mouth 4 times a day.  Depression, unspecified depression type  -     ARIPiprazole (Abilify) 5 mg tablet; Take 1 tablet (5 mg) by mouth once daily.  -     escitalopram (Lexapro) 20 mg tablet; Take 1 tablet (20 mg) by mouth once daily.  Gastroesophageal reflux disease with esophagitis without hemorrhage  -     pantoprazole (ProtoNix) 40 mg EC tablet; Take 1 tablet (40 mg) by mouth once daily.  Gout, unspecified cause, unspecified chronicity, unspecified site  -     Uric acid; Future  -     allopurinol (Zyloprim) 300 mg tablet; Take 1 tablet " "(300 mg) by mouth once daily.  Hyperlipidemia, unspecified hyperlipidemia type  -     Lipid panel; Future  -     atorvastatin (Lipitor) 40 mg tablet; Take 1 tablet (40 mg) by mouth once daily.  Hypertension, unspecified type  -     hydroCHLOROthiazide (HYDRODiuril) 25 mg tablet; Take 1 tablet (25 mg) by mouth once daily.  -     metoprolol tartrate (Lopressor) 50 mg tablet; Take 1 tablet by mouth 2 times a day.  Insomnia, unspecified type  -     mirtazapine (Remeron) 30 mg tablet; Take 1 tablet (30 mg) by mouth once daily at bedtime.  Prostate enlargement  -     tamsulosin (Flomax) 0.4 mg 24 hr capsule; Take 1 capsule (0.4 mg) by mouth once daily.  Type 2 diabetes mellitus without complication, with long-term current use of insulin  -     Comprehensive metabolic panel; Future  -     POCT glycosylated hemoglobin (Hb A1C) manually resulted  -     metFORMIN (Glucophage) 1,000 mg tablet; Take 1 tablet (1,000 mg) by mouth 2 times a day.  -     exenatide microspheres (Bydureon BCise) 2 mg/0.85 mL auto-injector; 1 syringe under the skin one time per week.  -     insulin glargine (Lantus Solostar U-100 Insulin) 100 unit/mL (3 mL) pen; Inject 150 Units under the skin once daily at bedtime.  -     SITagliptin phosphate (Januvia) 100 mg tablet; Take 1 tablet (100 mg) by mouth once daily.  -     pen needle, diabetic (Sure Comfort Pen Needle) 31 gauge x 3/16\" needle; Use as directed with lantus  -     lancets (OneTouch Delica Plus Lancet) 33 gauge misc; Use to test four times daily       "

## 2025-05-03 LAB
ALBUMIN SERPL-MCNC: 4.3 G/DL (ref 3.6–5.1)
ALP SERPL-CCNC: 67 U/L (ref 35–144)
ALT SERPL-CCNC: 16 U/L (ref 9–46)
ANION GAP SERPL CALCULATED.4IONS-SCNC: 10 MMOL/L (CALC) (ref 7–17)
AST SERPL-CCNC: 19 U/L (ref 10–35)
BILIRUB SERPL-MCNC: 0.7 MG/DL (ref 0.2–1.2)
BUN SERPL-MCNC: 18 MG/DL (ref 7–25)
CALCIUM SERPL-MCNC: 9.3 MG/DL (ref 8.6–10.3)
CHLORIDE SERPL-SCNC: 103 MMOL/L (ref 98–110)
CHOLEST SERPL-MCNC: 98 MG/DL
CHOLEST/HDLC SERPL: 2.2 (CALC)
CO2 SERPL-SCNC: 29 MMOL/L (ref 20–32)
CREAT SERPL-MCNC: 1.09 MG/DL (ref 0.7–1.35)
EGFRCR SERPLBLD CKD-EPI 2021: 75 ML/MIN/1.73M2
GLUCOSE SERPL-MCNC: 71 MG/DL (ref 65–99)
HDLC SERPL-MCNC: 44 MG/DL
LDLC SERPL CALC-MCNC: 42 MG/DL (CALC)
NONHDLC SERPL-MCNC: 54 MG/DL (CALC)
POTASSIUM SERPL-SCNC: 4.1 MMOL/L (ref 3.5–5.3)
PROT SERPL-MCNC: 6.4 G/DL (ref 6.1–8.1)
SODIUM SERPL-SCNC: 142 MMOL/L (ref 135–146)
TRIGL SERPL-MCNC: 50 MG/DL
URATE SERPL-MCNC: 4.5 MG/DL (ref 4–8)

## 2025-06-04 ENCOUNTER — TELEPHONE (OUTPATIENT)
Dept: PRIMARY CARE | Facility: CLINIC | Age: 65
End: 2025-06-04
Payer: COMMERCIAL

## 2025-06-04 DIAGNOSIS — Z79.4 TYPE 2 DIABETES MELLITUS WITHOUT COMPLICATION, WITH LONG-TERM CURRENT USE OF INSULIN: ICD-10-CM

## 2025-06-04 DIAGNOSIS — E11.9 TYPE 2 DIABETES MELLITUS WITHOUT COMPLICATION, WITH LONG-TERM CURRENT USE OF INSULIN: ICD-10-CM

## 2025-06-04 NOTE — TELEPHONE ENCOUNTER
Received fax from [facility] stating Bydureon has been discontinued.   Dr. Gill reviewed, Rx of Trulicity placed, please approve as discussed. Patient also informed that he will need to come into the office once he rece

## 2025-06-05 RX ORDER — DULAGLUTIDE 0.75 MG/.5ML
0.75 INJECTION, SOLUTION SUBCUTANEOUS WEEKLY
Qty: 4 EACH | Refills: 0 | Status: SHIPPED | OUTPATIENT
Start: 2025-06-05 | End: 2025-07-05

## 2025-06-06 ENCOUNTER — TELEPHONE (OUTPATIENT)
Dept: PRIMARY CARE | Facility: CLINIC | Age: 65
End: 2025-06-06
Payer: COMMERCIAL

## 2025-06-17 ENCOUNTER — APPOINTMENT (OUTPATIENT)
Dept: PRIMARY CARE | Facility: CLINIC | Age: 65
End: 2025-06-17
Payer: COMMERCIAL

## 2025-06-17 VITALS
BODY MASS INDEX: 39.62 KG/M2 | TEMPERATURE: 98.2 F | SYSTOLIC BLOOD PRESSURE: 126 MMHG | OXYGEN SATURATION: 97 % | DIASTOLIC BLOOD PRESSURE: 70 MMHG | WEIGHT: 283 LBS | HEART RATE: 78 BPM | HEIGHT: 71 IN

## 2025-06-17 DIAGNOSIS — Z79.4 TYPE 2 DIABETES MELLITUS WITHOUT COMPLICATION, WITH LONG-TERM CURRENT USE OF INSULIN: Primary | ICD-10-CM

## 2025-06-17 DIAGNOSIS — E11.9 TYPE 2 DIABETES MELLITUS WITHOUT COMPLICATION, WITH LONG-TERM CURRENT USE OF INSULIN: Primary | ICD-10-CM

## 2025-06-17 PROCEDURE — 99214 OFFICE O/P EST MOD 30 MIN: CPT | Performed by: FAMILY MEDICINE

## 2025-06-17 PROCEDURE — 3044F HG A1C LEVEL LT 7.0%: CPT | Performed by: FAMILY MEDICINE

## 2025-06-17 PROCEDURE — 3008F BODY MASS INDEX DOCD: CPT | Performed by: FAMILY MEDICINE

## 2025-06-17 PROCEDURE — 3074F SYST BP LT 130 MM HG: CPT | Performed by: FAMILY MEDICINE

## 2025-06-17 PROCEDURE — 1036F TOBACCO NON-USER: CPT | Performed by: FAMILY MEDICINE

## 2025-06-17 PROCEDURE — 1159F MED LIST DOCD IN RCRD: CPT | Performed by: FAMILY MEDICINE

## 2025-06-17 PROCEDURE — 3078F DIAST BP <80 MM HG: CPT | Performed by: FAMILY MEDICINE

## 2025-06-17 NOTE — PROGRESS NOTES
"Subjective   Patient ID: Domingo Conrad is a 65 y.o. male who presents for Follow-up and Diabetes, instructions on how to use Trulicity.  Bydureon no longer covered by his medical insurance.     HPI Patient had fasting reading of 78 this morning.     Review of Systems   All other systems reviewed and are negative.      Objective   /70   Pulse 78   Temp 36.8 °C (98.2 °F)   Ht 1.803 m (5' 11\")   Wt 128 kg (283 lb)   SpO2 97%   BMI 39.47 kg/m²     Physical Exam  Constitutional:       Appearance: Normal appearance.   Eyes:      Conjunctiva/sclera: Conjunctivae normal.   Cardiovascular:      Rate and Rhythm: Normal rate and regular rhythm.   Pulmonary:      Effort: Pulmonary effort is normal.      Breath sounds: Normal breath sounds.   Abdominal:      Palpations: Abdomen is soft.   Musculoskeletal:         General: Normal range of motion.   Skin:     General: Skin is warm and dry.   Neurological:      Mental Status: He is alert.   Psychiatric:         Mood and Affect: Mood normal.         Assessment/Plan   Diagnoses and all orders for this visit:  Type 2 diabetes mellitus without complication, with long-term current use of insulin        - Pt presented today to start injection of GLP1 medication. We reviewed the specifics of the Trulicity pen. Reviewed proper injection techniques and how to properly dispose. He was able to repeat all of the instructions. He gave his first injection independently with not problems. All questions answered. RTC 3 months.      "

## 2025-07-02 DIAGNOSIS — Z79.4 TYPE 2 DIABETES MELLITUS WITHOUT COMPLICATION, WITH LONG-TERM CURRENT USE OF INSULIN: ICD-10-CM

## 2025-07-02 DIAGNOSIS — E11.9 TYPE 2 DIABETES MELLITUS WITHOUT COMPLICATION, WITH LONG-TERM CURRENT USE OF INSULIN: ICD-10-CM

## 2025-07-07 RX ORDER — DULAGLUTIDE 1.5 MG/.5ML
1.5 INJECTION, SOLUTION SUBCUTANEOUS WEEKLY
Qty: 2 ML | Refills: 1 | Status: SHIPPED | OUTPATIENT
Start: 2025-07-07 | End: 2025-09-01

## 2025-07-10 DIAGNOSIS — Z79.4 TYPE 2 DIABETES MELLITUS WITHOUT COMPLICATION, WITH LONG-TERM CURRENT USE OF INSULIN: ICD-10-CM

## 2025-07-10 DIAGNOSIS — E11.9 TYPE 2 DIABETES MELLITUS WITHOUT COMPLICATION, WITH LONG-TERM CURRENT USE OF INSULIN: ICD-10-CM

## 2025-07-10 RX ORDER — PEN NEEDLE, DIABETIC 30 GX3/16"
NEEDLE, DISPOSABLE MISCELLANEOUS
Qty: 100 EACH | Refills: 0 | Status: SHIPPED | OUTPATIENT
Start: 2025-07-10

## 2025-07-29 DIAGNOSIS — G89.29 CHRONIC LOW BACK PAIN, UNSPECIFIED BACK PAIN LATERALITY, UNSPECIFIED WHETHER SCIATICA PRESENT: ICD-10-CM

## 2025-07-29 DIAGNOSIS — M54.50 CHRONIC LOW BACK PAIN, UNSPECIFIED BACK PAIN LATERALITY, UNSPECIFIED WHETHER SCIATICA PRESENT: ICD-10-CM

## 2025-07-29 DIAGNOSIS — Z79.4 TYPE 2 DIABETES MELLITUS WITHOUT COMPLICATION, WITH LONG-TERM CURRENT USE OF INSULIN: ICD-10-CM

## 2025-07-29 DIAGNOSIS — E11.9 TYPE 2 DIABETES MELLITUS WITHOUT COMPLICATION, WITH LONG-TERM CURRENT USE OF INSULIN: ICD-10-CM

## 2025-07-31 RX ORDER — METHOCARBAMOL 500 MG/1
500 TABLET, FILM COATED ORAL 4 TIMES DAILY
Qty: 120 TABLET | Refills: 0 | OUTPATIENT
Start: 2025-07-31

## 2025-07-31 RX ORDER — INSULIN GLARGINE 100 [IU]/ML
150 INJECTION, SOLUTION SUBCUTANEOUS NIGHTLY
Qty: 45 ML | Refills: 0 | OUTPATIENT
Start: 2025-07-31 | End: 2025-10-29

## 2025-08-01 ENCOUNTER — APPOINTMENT (OUTPATIENT)
Dept: PRIMARY CARE | Facility: CLINIC | Age: 65
End: 2025-08-01
Payer: COMMERCIAL

## 2025-08-01 VITALS
DIASTOLIC BLOOD PRESSURE: 58 MMHG | WEIGHT: 279.8 LBS | HEART RATE: 79 BPM | BODY MASS INDEX: 39.17 KG/M2 | SYSTOLIC BLOOD PRESSURE: 124 MMHG | TEMPERATURE: 98.4 F | OXYGEN SATURATION: 95 % | HEIGHT: 71 IN

## 2025-08-01 DIAGNOSIS — M10.9 GOUT, UNSPECIFIED CAUSE, UNSPECIFIED CHRONICITY, UNSPECIFIED SITE: ICD-10-CM

## 2025-08-01 DIAGNOSIS — G89.29 CHRONIC LOW BACK PAIN, UNSPECIFIED BACK PAIN LATERALITY, UNSPECIFIED WHETHER SCIATICA PRESENT: ICD-10-CM

## 2025-08-01 DIAGNOSIS — Z12.5 SCREENING FOR PROSTATE CANCER: Primary | ICD-10-CM

## 2025-08-01 DIAGNOSIS — K21.00 GASTROESOPHAGEAL REFLUX DISEASE WITH ESOPHAGITIS WITHOUT HEMORRHAGE: ICD-10-CM

## 2025-08-01 DIAGNOSIS — G47.00 INSOMNIA, UNSPECIFIED TYPE: ICD-10-CM

## 2025-08-01 DIAGNOSIS — I10 HYPERTENSION, UNSPECIFIED TYPE: ICD-10-CM

## 2025-08-01 DIAGNOSIS — E11.9 TYPE 2 DIABETES MELLITUS WITHOUT COMPLICATION, WITH LONG-TERM CURRENT USE OF INSULIN: ICD-10-CM

## 2025-08-01 DIAGNOSIS — M54.50 CHRONIC LOW BACK PAIN, UNSPECIFIED BACK PAIN LATERALITY, UNSPECIFIED WHETHER SCIATICA PRESENT: ICD-10-CM

## 2025-08-01 DIAGNOSIS — N40.0 PROSTATE ENLARGEMENT: ICD-10-CM

## 2025-08-01 DIAGNOSIS — E78.5 HYPERLIPIDEMIA, UNSPECIFIED HYPERLIPIDEMIA TYPE: ICD-10-CM

## 2025-08-01 DIAGNOSIS — B35.6 TINEA CRURIS: ICD-10-CM

## 2025-08-01 DIAGNOSIS — Z79.4 TYPE 2 DIABETES MELLITUS WITHOUT COMPLICATION, WITH LONG-TERM CURRENT USE OF INSULIN: ICD-10-CM

## 2025-08-01 DIAGNOSIS — F32.A DEPRESSION, UNSPECIFIED DEPRESSION TYPE: ICD-10-CM

## 2025-08-01 LAB — POC HEMOGLOBIN A1C: 5.8 % (ref 4.2–6.5)

## 2025-08-01 PROCEDURE — 3078F DIAST BP <80 MM HG: CPT | Performed by: FAMILY MEDICINE

## 2025-08-01 PROCEDURE — 3008F BODY MASS INDEX DOCD: CPT | Performed by: FAMILY MEDICINE

## 2025-08-01 PROCEDURE — 3044F HG A1C LEVEL LT 7.0%: CPT | Performed by: FAMILY MEDICINE

## 2025-08-01 PROCEDURE — 83036 HEMOGLOBIN GLYCOSYLATED A1C: CPT | Performed by: FAMILY MEDICINE

## 2025-08-01 PROCEDURE — 99215 OFFICE O/P EST HI 40 MIN: CPT | Performed by: FAMILY MEDICINE

## 2025-08-01 PROCEDURE — 3074F SYST BP LT 130 MM HG: CPT | Performed by: FAMILY MEDICINE

## 2025-08-01 PROCEDURE — 1036F TOBACCO NON-USER: CPT | Performed by: FAMILY MEDICINE

## 2025-08-01 PROCEDURE — 1159F MED LIST DOCD IN RCRD: CPT | Performed by: FAMILY MEDICINE

## 2025-08-01 RX ORDER — ALLOPURINOL 300 MG/1
300 TABLET ORAL DAILY
Qty: 90 TABLET | Refills: 0 | Status: SHIPPED | OUTPATIENT
Start: 2025-08-01 | End: 2025-10-30

## 2025-08-01 RX ORDER — ATORVASTATIN CALCIUM 40 MG/1
40 TABLET, FILM COATED ORAL DAILY
Qty: 90 TABLET | Refills: 0 | Status: SHIPPED | OUTPATIENT
Start: 2025-08-01 | End: 2025-10-30

## 2025-08-01 RX ORDER — ESCITALOPRAM OXALATE 20 MG/1
20 TABLET ORAL DAILY
Qty: 90 TABLET | Refills: 0 | Status: SHIPPED | OUTPATIENT
Start: 2025-08-01 | End: 2025-10-30

## 2025-08-01 RX ORDER — MIRTAZAPINE 30 MG/1
30 TABLET, FILM COATED ORAL NIGHTLY
Qty: 90 TABLET | Refills: 0 | Status: SHIPPED | OUTPATIENT
Start: 2025-08-01 | End: 2025-10-30

## 2025-08-01 RX ORDER — PANTOPRAZOLE SODIUM 40 MG/1
40 TABLET, DELAYED RELEASE ORAL DAILY
Qty: 90 TABLET | Refills: 0 | Status: SHIPPED | OUTPATIENT
Start: 2025-08-01 | End: 2025-10-30

## 2025-08-01 RX ORDER — CLOTRIMAZOLE AND BETAMETHASONE DIPROPIONATE 10; .64 MG/G; MG/G
1 CREAM TOPICAL 2 TIMES DAILY
Qty: 45 G | Refills: 0 | Status: SHIPPED | OUTPATIENT
Start: 2025-08-01 | End: 2025-08-15

## 2025-08-01 RX ORDER — METHOCARBAMOL 500 MG/1
500 TABLET, FILM COATED ORAL 4 TIMES DAILY
Qty: 120 TABLET | Refills: 2 | Status: SHIPPED | OUTPATIENT
Start: 2025-08-01 | End: 2025-10-30

## 2025-08-01 RX ORDER — TAMSULOSIN HYDROCHLORIDE 0.4 MG/1
0.4 CAPSULE ORAL DAILY
Qty: 90 CAPSULE | Refills: 0 | Status: SHIPPED | OUTPATIENT
Start: 2025-08-01 | End: 2025-10-30

## 2025-08-01 RX ORDER — METFORMIN HYDROCHLORIDE 1000 MG/1
1000 TABLET ORAL 2 TIMES DAILY
Qty: 180 TABLET | Refills: 0 | Status: SHIPPED | OUTPATIENT
Start: 2025-08-01 | End: 2025-10-30

## 2025-08-01 RX ORDER — METOPROLOL TARTRATE 50 MG/1
50 TABLET ORAL 2 TIMES DAILY
Qty: 180 TABLET | Refills: 0 | Status: SHIPPED | OUTPATIENT
Start: 2025-08-01

## 2025-08-01 RX ORDER — HYDROCHLOROTHIAZIDE 25 MG/1
25 TABLET ORAL DAILY
Qty: 90 TABLET | Refills: 0 | Status: SHIPPED | OUTPATIENT
Start: 2025-08-01

## 2025-08-01 RX ORDER — DULAGLUTIDE 1.5 MG/.5ML
1.5 INJECTION, SOLUTION SUBCUTANEOUS WEEKLY
Qty: 2 ML | Refills: 1 | Status: SHIPPED | OUTPATIENT
Start: 2025-08-01 | End: 2025-09-26

## 2025-08-01 RX ORDER — INSULIN GLARGINE 100 [IU]/ML
150 INJECTION, SOLUTION SUBCUTANEOUS NIGHTLY
Qty: 45 ML | Refills: 2 | Status: SHIPPED | OUTPATIENT
Start: 2025-08-01 | End: 2025-10-30

## 2025-08-01 RX ORDER — ARIPIPRAZOLE 5 MG/1
5 TABLET ORAL DAILY
Qty: 90 TABLET | Refills: 0 | Status: SHIPPED | OUTPATIENT
Start: 2025-08-01 | End: 2025-10-30

## 2025-08-01 NOTE — PROGRESS NOTES
"Subjective   Patient ID: Domingo Conrad is a 65 y.o. male who presents for Med Refill and Mass (Left side of penis).    HPI     Pt is here for refills of medications to treat the following medical conditions. Unless otherwise stated, these conditions are well-controlled, pt is taking medications as Rx, and there are no reported side effects to medications or other treatment: Chronic low back pian, depression, GERD, Gout, hyperlipidemia, hypertension, insomnia, prostate enlargement, DM2    Noticed a bump on the left side of his penis that he noticed 1 week ago. States that it hurts to walk because it rubs. Looks like a little red piece of skin and is very irritating. Painful. Does not think it is a wart, does not look similar to that, and becomes very sore when he walks due to friction. Put some Vaseline on it to try to help the pain, only helps a little.    Review of Systems   All other systems reviewed and are negative.      Objective   /58 (BP Location: Left arm, Patient Position: Sitting, BP Cuff Size: Large adult)   Pulse 79   Temp 36.9 °C (98.4 °F) (Oral)   Ht 1.803 m (5' 11\")   Wt 127 kg (279 lb 12.8 oz)   SpO2 95%   BMI 39.02 kg/m²     Physical Exam  Vitals reviewed.   Constitutional:       General: He is awake.      Appearance: Normal appearance. He is well-developed.   HENT:      Head: Normocephalic and atraumatic.     Cardiovascular:      Rate and Rhythm: Normal rate.   Pulmonary:      Effort: Pulmonary effort is normal.     Musculoskeletal:      Cervical back: Full passive range of motion without pain.      Right lower leg: No edema.      Left lower leg: No edema.     Skin:     General: Skin is warm and dry.      Findings: No lesion or rash.     Neurological:      General: No focal deficit present.      Mental Status: He is alert and oriented to person, place, and time.      Cranial Nerves: No facial asymmetry.      Motor: Motor function is intact.      Gait: Gait is intact.     Psychiatric:    "      Attention and Perception: Attention normal.         Mood and Affect: Mood and affect normal.         Assessment/Plan   Diagnoses and all orders for this visit:  Screening for prostate cancer  -     Prostate Specific Antigen, Screen; Future  Gout, unspecified cause, unspecified chronicity, unspecified site  -     allopurinol (Zyloprim) 300 mg tablet; Take 1 tablet (300 mg) by mouth once daily.  Depression, unspecified depression type  -     ARIPiprazole (Abilify) 5 mg tablet; Take 1 tablet (5 mg) by mouth once daily.  -     escitalopram (Lexapro) 20 mg tablet; Take 1 tablet (20 mg) by mouth once daily.  Hyperlipidemia, unspecified hyperlipidemia type  -     atorvastatin (Lipitor) 40 mg tablet; Take 1 tablet (40 mg) by mouth once daily.  Type 2 diabetes mellitus without complication, with long-term current use of insulin  -     POCT glycosylated hemoglobin (Hb A1C) manually resulted  -     Comprehensive metabolic panel; Future  -     dulaglutide (Trulicity) 1.5 mg/0.5 mL pen injector injection; Inject 1.5 mg under the skin 1 (one) time per week.  -     insulin glargine (Lantus Solostar U-100 Insulin) 100 unit/mL (3 mL) pen; Inject 150 Units under the skin once daily at bedtime.  -     metFORMIN (Glucophage) 1,000 mg tablet; Take 1 tablet (1,000 mg) by mouth 2 times a day.  -     SITagliptin phosphate (Januvia) 100 mg tablet; Take 1 tablet (100 mg) by mouth once daily.  -     Albumin-Creatinine Ratio, Random Urine; Future  Hypertension, unspecified type  -     hydroCHLOROthiazide (HYDRODiuril) 25 mg tablet; Take 1 tablet (25 mg) by mouth once daily.  -     metoprolol tartrate (Lopressor) 50 mg tablet; Take 1 tablet by mouth 2 times a day.  Chronic low back pain, unspecified back pain laterality, unspecified whether sciatica present  -     methocarbamol (Robaxin) 500 mg tablet; Take 1 tablet (500 mg) by mouth 4 times a day.  Insomnia, unspecified type  -     mirtazapine (Remeron) 30 mg tablet; Take 1 tablet (30 mg)  by mouth once daily at bedtime.  Gastroesophageal reflux disease with esophagitis without hemorrhage  -     pantoprazole (ProtoNix) 40 mg EC tablet; Take 1 tablet (40 mg) by mouth once daily.  Prostate enlargement  -     tamsulosin (Flomax) 0.4 mg 24 hr capsule; Take 1 capsule (0.4 mg) by mouth once daily.  Tinea cruris  -     clotrimazole-betamethasone (Lotrisone) cream; Apply 1 Application topically 2 times a day for 14 days.

## 2025-08-02 LAB
ALBUMIN SERPL-MCNC: 4.3 G/DL (ref 3.6–5.1)
ALP SERPL-CCNC: 87 U/L (ref 35–144)
ALT SERPL-CCNC: 19 U/L (ref 9–46)
ANION GAP SERPL CALCULATED.4IONS-SCNC: 12 MMOL/L (CALC) (ref 7–17)
AST SERPL-CCNC: 20 U/L (ref 10–35)
BILIRUB SERPL-MCNC: 0.6 MG/DL (ref 0.2–1.2)
BUN SERPL-MCNC: 19 MG/DL (ref 7–25)
CALCIUM SERPL-MCNC: 9.1 MG/DL (ref 8.6–10.3)
CHLORIDE SERPL-SCNC: 100 MMOL/L (ref 98–110)
CO2 SERPL-SCNC: 27 MMOL/L (ref 20–32)
CREAT SERPL-MCNC: 1.16 MG/DL (ref 0.7–1.35)
EGFRCR SERPLBLD CKD-EPI 2021: 69 ML/MIN/1.73M2
GLUCOSE SERPL-MCNC: 150 MG/DL (ref 65–99)
POTASSIUM SERPL-SCNC: 4.1 MMOL/L (ref 3.5–5.3)
PROT SERPL-MCNC: 6.3 G/DL (ref 6.1–8.1)
PSA SERPL-MCNC: 1.05 NG/ML
SODIUM SERPL-SCNC: 139 MMOL/L (ref 135–146)

## 2025-08-04 LAB
ALBUMIN/CREAT UR: NORMAL MG/G CREAT
CREAT UR-MCNC: 78 MG/DL (ref 20–320)
MICROALBUMIN UR-MCNC: <0.2 MG/DL

## 2025-08-14 DIAGNOSIS — K21.00 GASTROESOPHAGEAL REFLUX DISEASE WITH ESOPHAGITIS WITHOUT HEMORRHAGE: ICD-10-CM

## 2025-08-15 RX ORDER — PANTOPRAZOLE SODIUM 40 MG/1
40 TABLET, DELAYED RELEASE ORAL DAILY
Qty: 90 TABLET | Refills: 0 | OUTPATIENT
Start: 2025-08-15

## 2025-08-18 ENCOUNTER — TELEPHONE (OUTPATIENT)
Dept: PRIMARY CARE | Facility: CLINIC | Age: 65
End: 2025-08-18
Payer: COMMERCIAL

## 2025-08-18 DIAGNOSIS — E11.9 TYPE 2 DIABETES MELLITUS WITHOUT COMPLICATION, WITH LONG-TERM CURRENT USE OF INSULIN: ICD-10-CM

## 2025-08-18 DIAGNOSIS — Z79.4 TYPE 2 DIABETES MELLITUS WITHOUT COMPLICATION, WITH LONG-TERM CURRENT USE OF INSULIN: ICD-10-CM

## 2025-08-19 RX ORDER — DEXTROSE 4 G
TABLET,CHEWABLE ORAL
Qty: 1 EACH | Refills: 0 | Status: SHIPPED | OUTPATIENT
Start: 2025-08-19

## 2025-08-19 RX ORDER — IBUPROFEN 200 MG
CAPSULE ORAL
Qty: 125 STRIP | Refills: 11 | Status: SHIPPED | OUTPATIENT
Start: 2025-08-19

## 2025-08-19 RX ORDER — LANCETS
EACH MISCELLANEOUS
Qty: 125 EACH | Refills: 11 | Status: SHIPPED | OUTPATIENT
Start: 2025-08-19

## 2025-10-31 ENCOUNTER — APPOINTMENT (OUTPATIENT)
Dept: PRIMARY CARE | Facility: CLINIC | Age: 65
End: 2025-10-31
Payer: COMMERCIAL